# Patient Record
Sex: FEMALE | Race: OTHER | HISPANIC OR LATINO | URBAN - METROPOLITAN AREA
[De-identification: names, ages, dates, MRNs, and addresses within clinical notes are randomized per-mention and may not be internally consistent; named-entity substitution may affect disease eponyms.]

---

## 2021-04-12 ENCOUNTER — EMERGENCY (EMERGENCY)
Facility: HOSPITAL | Age: 31
LOS: 0 days | Discharge: HOME | End: 2021-04-12
Attending: EMERGENCY MEDICINE | Admitting: EMERGENCY MEDICINE
Payer: COMMERCIAL

## 2021-04-12 VITALS
TEMPERATURE: 98 F | RESPIRATION RATE: 18 BRPM | SYSTOLIC BLOOD PRESSURE: 115 MMHG | DIASTOLIC BLOOD PRESSURE: 65 MMHG | OXYGEN SATURATION: 100 % | HEART RATE: 96 BPM
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
## 2021-04-12 DIAGNOSIS — M54.2 CERVICALGIA: ICD-10-CM
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
## 2021-04-12 PROCEDURE — 99283 EMERGENCY DEPT VISIT LOW MDM: CPT

## 2021-04-12 RX ORDER — IBUPROFEN 200 MG
600 TABLET ORAL ONCE
Refills: 0 | Status: COMPLETED | OUTPATIENT
Start: 2021-04-12 | End: 2021-04-12

## 2021-04-12 RX ADMIN — Medication 600 MILLIGRAM(S): at 15:24

## 2021-04-12 NOTE — ED PROVIDER NOTE - PROGRESS NOTE DETAILS
Ice and analgesia given.  Patient to be discharged from ED. Any available test results were discussed with patient and/or family. Verbal instructions given, including instructions to return to ED immediately for any new, worsening, or concerning symptoms. Patient endorsed understanding. Written discharge instructions additionally given, including follow-up plan.  Patient was given opportunity to ask questions.
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
## 2021-04-12 NOTE — ED PROVIDER NOTE - PHYSICAL EXAMINATION
VITALS:  I have reviewed the initial vital signs.  GENERAL: Well-developed, well-nourished, in no acute distress. Nontoxic.  HEENT: NC/AT. EOMI, PERRLA. MMM.  NECK: supple w FROM. +R paraspinal muscle ttp. No midline cervical spinous tenderness, step offs, or deformity.  CARDIO: RRR, nl S1 and S2. No murmurs, rubs, or gallops.  PULM: Normal effort. CTA b/l without wheezes, rales, or rhonchi.  MSK: No paraspinal muscle ttp. No midline thoracic or lumbar spinous tenderness, step offs, or deformity. Normal, steady gait. FROM to extremities x4. No joint swelling, erythema, deformity, or ttp.  GI: Abdomen soft and non-distended. Nontender.  SKIN: Warm, dry. No erythema, ecchymosis, or wounds. No seatbelt sign.  NEURO: A&Ox3. Speech clear. CN II-XII intact. 5/5 strength to upper and lower extremities b/l. Sensation intact and equal throughout.  PSYCH: Calm and cooperative.
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
## 2021-04-12 NOTE — ED PROVIDER NOTE - PATIENT PORTAL LINK FT
You can access the FollowMyHealth Patient Portal offered by [facility] by registering at the following website: [email or web address]. By joining [facility]’s FollowMyHealth portal, you will also be able to view your health information using other applications (apps) compatible with our system.

## 2021-04-12 NOTE — ED PROVIDER NOTE - NS ED ROS FT
EYES: (-) vision changes, (-) blurry vision, (-) double vision, (-) eye pain  ENT: (-) tinnitus, (-) epistaxis, (-) tooth pain, (-) facial swelling  NECK: (+) neck pain, (-) neck stiffness  CARDIO: (-) chest pain, (-) palpitations  PULM: (-) cough, (-) shortness of breath,  (-) hemoptysis, (-) stridor  GI: (-) nausea, (-) vomiting, (-) abdominal pain, (-) bowel incontinence/retention  : (-) hematuria, (-) incontinence, (-) difficulty urinating, (-) urinary retention, (-) flank pain  HEME: (-) easy bruising, (-) easy bleeding  MSK: (-) back pain, (-) gait difficulty, (-) joint pain, (-) deformity, (-) joint swelling  SKIN: (-) rashes, (-) pallor,  (-) wounds, (-) ecchymosis  NEURO: (-) headache, (-) head injury, (-) LOC, (-) dizziness, (-) lightheadedness,  (-) weakness, (-) paresthesias, (-) numbness, (-) syncope    *all other systems negative except as documented above and in the HPI*

## 2021-06-29 ENCOUNTER — EMERGENCY (EMERGENCY)
Facility: HOSPITAL | Age: 31
LOS: 0 days | Discharge: HOME | End: 2021-06-30
Attending: EMERGENCY MEDICINE | Admitting: EMERGENCY MEDICINE
Payer: MEDICAID

## 2021-06-29 VITALS
RESPIRATION RATE: 17 BRPM | HEART RATE: 88 BPM | DIASTOLIC BLOOD PRESSURE: 64 MMHG | OXYGEN SATURATION: 100 % | TEMPERATURE: 98 F | WEIGHT: 113.98 LBS | SYSTOLIC BLOOD PRESSURE: 100 MMHG

## 2021-06-29 DIAGNOSIS — R11.2 NAUSEA WITH VOMITING, UNSPECIFIED: ICD-10-CM

## 2021-06-29 DIAGNOSIS — R10.10 UPPER ABDOMINAL PAIN, UNSPECIFIED: ICD-10-CM

## 2021-06-29 DIAGNOSIS — R19.7 DIARRHEA, UNSPECIFIED: ICD-10-CM

## 2021-06-29 DIAGNOSIS — N83.9 NONINFLAMMATORY DISORDER OF OVARY, FALLOPIAN TUBE AND BROAD LIGAMENT, UNSPECIFIED: ICD-10-CM

## 2021-06-29 DIAGNOSIS — Z90.721 ACQUIRED ABSENCE OF OVARIES, UNILATERAL: ICD-10-CM

## 2021-06-29 PROCEDURE — 99285 EMERGENCY DEPT VISIT HI MDM: CPT

## 2021-06-29 PROCEDURE — 99213 OFFICE O/P EST LOW 20 MIN: CPT

## 2021-06-29 NOTE — ED ADULT NURSE NOTE - NSIMPLEMENTINTERV_GEN_ALL_ED
Implemented All Universal Safety Interventions:  [place] to call system. Call bell, personal items and telephone within reach. Instruct patient to call for assistance. Room bathroom lighting operational. Non-slip footwear when patient is off stretcher. Physically safe environment: no spills, clutter or unnecessary equipment. Stretcher in lowest position, wheels locked, appropriate side rails in place.

## 2021-06-30 VITALS
OXYGEN SATURATION: 100 % | TEMPERATURE: 97 F | DIASTOLIC BLOOD PRESSURE: 63 MMHG | SYSTOLIC BLOOD PRESSURE: 94 MMHG | RESPIRATION RATE: 18 BRPM | HEART RATE: 71 BPM

## 2021-06-30 LAB
ALBUMIN SERPL ELPH-MCNC: 4.7 G/DL — SIGNIFICANT CHANGE UP (ref 3.5–5.2)
ALP SERPL-CCNC: 75 U/L — SIGNIFICANT CHANGE UP (ref 30–115)
ALT FLD-CCNC: 25 U/L — SIGNIFICANT CHANGE UP (ref 0–41)
ANION GAP SERPL CALC-SCNC: 15 MMOL/L — HIGH (ref 7–14)
APPEARANCE UR: CLEAR — SIGNIFICANT CHANGE UP
AST SERPL-CCNC: 28 U/L — SIGNIFICANT CHANGE UP (ref 0–41)
BACTERIA # UR AUTO: NEGATIVE — SIGNIFICANT CHANGE UP
BASOPHILS # BLD AUTO: 0.04 K/UL — SIGNIFICANT CHANGE UP (ref 0–0.2)
BASOPHILS NFR BLD AUTO: 0.2 % — SIGNIFICANT CHANGE UP (ref 0–1)
BILIRUB DIRECT SERPL-MCNC: <0.2 MG/DL — SIGNIFICANT CHANGE UP (ref 0–0.2)
BILIRUB INDIRECT FLD-MCNC: >0.3 MG/DL — SIGNIFICANT CHANGE UP (ref 0.2–1.2)
BILIRUB SERPL-MCNC: 0.5 MG/DL — SIGNIFICANT CHANGE UP (ref 0.2–1.2)
BILIRUB UR-MCNC: NEGATIVE — SIGNIFICANT CHANGE UP
BLD GP AB SCN SERPL QL: SIGNIFICANT CHANGE UP
BUN SERPL-MCNC: 10 MG/DL — SIGNIFICANT CHANGE UP (ref 10–20)
CALCIUM SERPL-MCNC: 9.4 MG/DL — SIGNIFICANT CHANGE UP (ref 8.5–10.1)
CHLORIDE SERPL-SCNC: 104 MMOL/L — SIGNIFICANT CHANGE UP (ref 98–110)
CO2 SERPL-SCNC: 22 MMOL/L — SIGNIFICANT CHANGE UP (ref 17–32)
COLOR SPEC: YELLOW — SIGNIFICANT CHANGE UP
CREAT SERPL-MCNC: 0.5 MG/DL — LOW (ref 0.7–1.5)
DIFF PNL FLD: ABNORMAL
EOSINOPHIL # BLD AUTO: 0.17 K/UL — SIGNIFICANT CHANGE UP (ref 0–0.7)
EOSINOPHIL NFR BLD AUTO: 1 % — SIGNIFICANT CHANGE UP (ref 0–8)
EPI CELLS # UR: 5 /HPF — SIGNIFICANT CHANGE UP (ref 0–5)
GLUCOSE SERPL-MCNC: 97 MG/DL — SIGNIFICANT CHANGE UP (ref 70–99)
GLUCOSE UR QL: NEGATIVE — SIGNIFICANT CHANGE UP
HCG SERPL-ACNC: <0.6 MIU/ML — SIGNIFICANT CHANGE UP
HCT VFR BLD CALC: 42.3 % — SIGNIFICANT CHANGE UP (ref 37–47)
HGB BLD-MCNC: 14 G/DL — SIGNIFICANT CHANGE UP (ref 12–16)
HYALINE CASTS # UR AUTO: 4 /LPF — SIGNIFICANT CHANGE UP (ref 0–7)
IMM GRANULOCYTES NFR BLD AUTO: 0.4 % — HIGH (ref 0.1–0.3)
KETONES UR-MCNC: ABNORMAL
LACTATE SERPL-SCNC: 1.3 MMOL/L — SIGNIFICANT CHANGE UP (ref 0.7–2)
LDH SERPL L TO P-CCNC: 201 — SIGNIFICANT CHANGE UP (ref 50–242)
LEUKOCYTE ESTERASE UR-ACNC: NEGATIVE — SIGNIFICANT CHANGE UP
LIDOCAIN IGE QN: 27 U/L — SIGNIFICANT CHANGE UP (ref 7–60)
LYMPHOCYTES # BLD AUTO: 1.75 K/UL — SIGNIFICANT CHANGE UP (ref 1.2–3.4)
LYMPHOCYTES # BLD AUTO: 10.1 % — LOW (ref 20.5–51.1)
MCHC RBC-ENTMCNC: 30.8 PG — SIGNIFICANT CHANGE UP (ref 27–31)
MCHC RBC-ENTMCNC: 33.1 G/DL — SIGNIFICANT CHANGE UP (ref 32–37)
MCV RBC AUTO: 93.2 FL — SIGNIFICANT CHANGE UP (ref 81–99)
MONOCYTES # BLD AUTO: 1.25 K/UL — HIGH (ref 0.1–0.6)
MONOCYTES NFR BLD AUTO: 7.2 % — SIGNIFICANT CHANGE UP (ref 1.7–9.3)
NEUTROPHILS # BLD AUTO: 14.06 K/UL — HIGH (ref 1.4–6.5)
NEUTROPHILS NFR BLD AUTO: 81.1 % — HIGH (ref 42.2–75.2)
NITRITE UR-MCNC: NEGATIVE — SIGNIFICANT CHANGE UP
NRBC # BLD: 0 /100 WBCS — SIGNIFICANT CHANGE UP (ref 0–0)
PH UR: 5.5 — SIGNIFICANT CHANGE UP (ref 5–8)
PLATELET # BLD AUTO: 206 K/UL — SIGNIFICANT CHANGE UP (ref 130–400)
POTASSIUM SERPL-MCNC: 3.6 MMOL/L — SIGNIFICANT CHANGE UP (ref 3.5–5)
POTASSIUM SERPL-SCNC: 3.6 MMOL/L — SIGNIFICANT CHANGE UP (ref 3.5–5)
PROT SERPL-MCNC: 7.4 G/DL — SIGNIFICANT CHANGE UP (ref 6–8)
PROT UR-MCNC: ABNORMAL
RBC # BLD: 4.54 M/UL — SIGNIFICANT CHANGE UP (ref 4.2–5.4)
RBC # FLD: 13.3 % — SIGNIFICANT CHANGE UP (ref 11.5–14.5)
RBC CASTS # UR COMP ASSIST: 3 /HPF — SIGNIFICANT CHANGE UP (ref 0–4)
SODIUM SERPL-SCNC: 141 MMOL/L — SIGNIFICANT CHANGE UP (ref 135–146)
SP GR SPEC: 1.03 — HIGH (ref 1.01–1.03)
UROBILINOGEN FLD QL: SIGNIFICANT CHANGE UP
WBC # BLD: 17.34 K/UL — HIGH (ref 4.8–10.8)
WBC # FLD AUTO: 17.34 K/UL — HIGH (ref 4.8–10.8)
WBC UR QL: 2 /HPF — SIGNIFICANT CHANGE UP (ref 0–5)

## 2021-06-30 PROCEDURE — 76830 TRANSVAGINAL US NON-OB: CPT | Mod: 26

## 2021-06-30 PROCEDURE — 74177 CT ABD & PELVIS W/CONTRAST: CPT | Mod: 26,MA

## 2021-06-30 RX ORDER — IOHEXOL 300 MG/ML
30 INJECTION, SOLUTION INTRAVENOUS ONCE
Refills: 0 | Status: COMPLETED | OUTPATIENT
Start: 2021-06-30 | End: 2021-06-30

## 2021-06-30 RX ORDER — FAMOTIDINE 10 MG/ML
20 INJECTION INTRAVENOUS ONCE
Refills: 0 | Status: COMPLETED | OUTPATIENT
Start: 2021-06-30 | End: 2021-06-30

## 2021-06-30 RX ORDER — ONDANSETRON 8 MG/1
4 TABLET, FILM COATED ORAL ONCE
Refills: 0 | Status: COMPLETED | OUTPATIENT
Start: 2021-06-29 | End: 2021-06-30

## 2021-06-30 RX ORDER — DIPHENHYDRAMINE HYDROCHLORIDE AND LIDOCAINE HYDROCHLORIDE AND ALUMINUM HYDROXIDE AND MAGNESIUM HYDRO
30 KIT ONCE
Refills: 0 | Status: COMPLETED | OUTPATIENT
Start: 2021-06-30 | End: 2021-06-30

## 2021-06-30 RX ADMIN — IOHEXOL 30 MILLILITER(S): 300 INJECTION, SOLUTION INTRAVENOUS at 00:30

## 2021-06-30 RX ADMIN — FAMOTIDINE 20 MILLIGRAM(S): 10 INJECTION INTRAVENOUS at 00:42

## 2021-06-30 RX ADMIN — ONDANSETRON 4 MILLIGRAM(S): 8 TABLET, FILM COATED ORAL at 00:42

## 2021-06-30 NOTE — ED PROVIDER NOTE - PROGRESS NOTE DETAILS
Abundio: case signed out to me by Dr. Curtis -- patient presented with 4 days of intermittent, cramping abdominal pain associated with multiple episodes of non bloody, non bilious vomiting and non bloody diarrhea -- on imagining found to have large R ovarian septated structure (of note is sp R salpingectomy). Though sx suggestive of gastro and not torsion. Given size of lesion, is pending US. Patient resting comfortably S/O from [facility]RON[facility] Estrella to follow us. Patient states feeling better. Abundio: received call from Dr. 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 However, based on size of mass, also cannot exclude torsion. GYN consulted. Abundio: received call from Dr. 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 However, based on size of mass, also cannot exclude torsion. GYN consulted.    Case signed out to Dr. Celeste Dr. Celeste: Received sign out from Dr. Del Castillo  Pt pending GYN consult Talked with GYN. Pt can fu as outpatient next week. They will call her for her appointment time. Recommended cancer screening labs. Discussed signs of torsion with patient

## 2021-06-30 NOTE — ED PROVIDER NOTE - DISPOSITION TYPE
DISCHARGE Subsequent Stages Histo Method Verbiage: Using a similar technique to that described above, a thin layer of tissue was removed from all areas where tumor was visible on the previous stage.  The tissue was again oriented, mapped, dyed, and processed as above.

## 2021-06-30 NOTE — CONSULT NOTE ADULT - SUBJECTIVE AND OBJECTIVE BOX
MINISTERIO PGY2    Chief Complaint: abdominal pain     HPI:   31yo P1, LMP 21, with no significant PMH, presented to ED for epigastric pain with nausea, vomiting, and diarrhea. Patient reported loose stool for 1 week and last night started having nausea and bloating and after eating a es she started having vomiting and diarrhea (5 episodes of both non-bloody diarrhea and NBNB vomiting). Denies fever or chills. Epigastric pain was 10 out of 10 in intensity last night and spontaneously improved to 3 out of 10 in ED. Received zofran in ED which helped with nausea. GYN was consulted for CT and TVUS finding of complex adnexal mass measuring 5 x 4 x 4 cm with septations and nodularity.     Location -  Severity -  Quality -  Duration -  Timing -   Modifying Factors -   Associated Signs/Symptoms -     Ob/Gyn History:  G P                 LMP -                   Cycle Length -   Denies history of ovarian cysts, uterine fibroids, abnormal paps, or STIs  Last Pap Smear -   Last Mammogram -   Last Colonoscopy -        Denies the following: constitutional symptoms, visual symptoms, cardiovascular symptoms, respiratory symptoms, GI symptoms, musculoskeletal symptoms, skin symptoms, neurologic symptoms, hematologic symptoms, allergic symptoms, psychiatric symptoms  Except any pertinent positives listed.     Home Medications:      Allergies    No Known Allergies    Intolerances        PAST MEDICAL & SURGICAL HISTORY:      FAMILY HISTORY:      SOCIAL HISTORY: Denies cigarette use, alcohol use, or illicit drug use    Vital Signs Last 24 Hrs  T(F): 97.7 (2021 22:59), Max: 97.7 (2021 22:59)  HR: 88 (2021 22:59) (88 - 88)  BP: 100/64 (2021 22:59) (100/64 - 100/64)  RR: 17 (2021 22:59) (17 - 17)    General Appearance - AAOx3, NAD  Heart - S1S2 regular rate and rhythm  Lung - CTA Bilaterally  Abdomen - Soft, nontender, nondistended, no rebound, no rigidity, no guarding, bowel sounds present    GYN/Pelvis:  External genitalia:  Vagina:  Cervix:  Uterus:  Adnexa:    LABS:                        14.0   17.34 )-----------( 206      ( 2021 00:00 )             42.3         06-30    141  |  104  |  10  ----------------------------<  97  3.6   |  22  |  0.5<L>    Ca    9.4      2021 00:00    TPro  7.4  /  Alb  4.7  /  TBili  0.5  /  DBili  <0.2  /  AST  28  /  ALT  25  /  AlkPhos  75  06-30      Urinalysis Basic - ( 2021 00:20 )    Color: Yellow / Appearance: Clear / S.034 / pH: x  Gluc: x / Ketone: Moderate  / Bili: Negative / Urobili: <2 mg/dL   Blood: x / Protein: 30 mg/dL / Nitrite: Negative   Leuk Esterase: Negative / RBC: 3 /HPF / WBC 2 /HPF   Sq Epi: x / Non Sq Epi: 5 /HPF / Bacteria: Negative          RADIOLOGY & ADDITIONAL STUDIES:   OBN PGY2    Chief Complaint: abdominal pain     HPI:   31yo P1, LMP 21, with no significant PMH, presented to ED for epigastric pain with nausea, vomiting, and diarrhea. Patient reported loose stool for 1 week and last night started having nausea and bloating after eating a chicken empanada at a new restaurant and after eating a es she started having vomiting and diarrhea (5 episodes of both non-bloody diarrhea and NBNB vomiting). Denies fever or chills. Denies similar symptoms with anyone in her household and denies sick contacts. Epigastric pain was sharp and 10 out of 10 in intensity last night and spontaneously improved to 3 out of 10 in ED. Received zofran in ED which helped with nausea. GYN was consulted for CT and TVUS finding of complex adnexal mass measuring 5 x 4 x 4 cm with septations and nodularity. Patient had a history of right salpingectomy and ovarian cystectomy in 10/2020 done in [place] for likely fallopian tube torsion per patient. Per patient, the cyst was benign. Patient does not remember name of GYN. Patient denies current RLQ pain and endorsed RLQ pain only on palpation from first exam in ED. Denies unintentional weight loss, night sweats. Endorsed bloating and early satiety only when she eats fast food.     Ob/Gyn History:                   LMP -     21              Cycle Length - regular, monthly   Previous h/o ovarian cyst with right cystectomy and right salpingectomy in 2020.   H/o chlamydia in Aug 2020, treated with abx, negative VAL.   Denies history of uterine fibroids, abnormal paps.  Last Pap Smear - 2021, normal per patient    Denies the following: constitutional symptoms, visual symptoms, cardiovascular symptoms, respiratory symptoms, GI symptoms, musculoskeletal symptoms, skin symptoms, neurologic symptoms, hematologic symptoms, allergic symptoms, psychiatric symptoms  Except any pertinent positives listed.     Home Medications: none  Allergies:  No Known Allergies      PAST MEDICAL & SURGICAL HISTORY:  No significant PMH  H/o R ovarian cystectomy and R. salpingectomy     FAMILY HISTORY:  colon cancer (great grandma)   GERD (mother)     SOCIAL HISTORY: Denies cigarette use, alcohol use, or illicit drug use    Vital Signs Last 24 Hrs  T(F): 97.7 (2021 22:59), Max: 97.7 (2021 22:59)  HR: 88 (2021 22:59) (88 - 88)  BP: 100/64 (2021 22:59) (100/64 - 100/64)  RR: 17 (2021 22:59) (17 - 17)    General Appearance - AAOx3, NAD  Heart - S1S2 regular rate and rhythm  Lung - CTA Bilaterally  Abdomen - Soft, nontender, nondistended, no rebound, no rigidity, no guarding, bowel sounds present    GYN/Pelvis:  External genitalia: normal, no lesions   Vagina: no bleeding noted  Cervix: closed, negative CMT  Uterus: normal sized anteverted uterus, no fundal tenderness  Adnexa: no masses or tenderness palpated bilaterally     LABS:                        14.0   17.34 )-----------( 206      ( 2021 00:00 )             42.3             141  |  104  |  10  ----------------------------<  97  3.6   |  22  |  0.5<L>    Ca    9.4      2021 00:00    TPro  7.4  /  Alb  4.7  /  TBili  0.5  /  DBili  <0.2  /  AST  28  /  ALT  25  /  AlkPhos  75        Urinalysis Basic - ( 2021 00:20 )    Color: Yellow / Appearance: Clear / S.034 / pH: x  Gluc: x / Ketone: Moderate  / Bili: Negative / Urobili: <2 mg/dL   Blood: x / Protein: 30 mg/dL / Nitrite: Negative   Leuk Esterase: Negative / RBC: 3 /HPF / WBC 2 /HPF   Sq Epi: x / Non Sq Epi: 5 /HPF / Bacteria: Negative          RADIOLOGY & ADDITIONAL STUDIES:  < from: US Transvaginal (21 @ 07:44) >    EXAM:  US TRANSVAGINAL            PROCEDURE DATE:  2021            INTERPRETATION:  CLINICAL INFORMATION: Right adnexal cyst    LMP: 2021    COMPARISON: Abdominal pelvic CT 2021    TECHNIQUE:  Transabdominal and transvaginal ultrasound the pelvis was performed    FINDINGS:    Uterus: 8.0 cm x 4.4 cm x 5.9 cm. Within normal limits.  Endometrium: 7 mm. Within normal limits.    Right ovary: 5.8 cm x 3.4 cm x 4.5 cm. Right ovarian septated cyst measuring 5.5 x 4.4 x 3.0 cm. The septations are thick and there is some mural nodularity; both of these appear to contain vascular flow.  Left ovary: 2.3 cm x 1.7 cm x 1.9 cm. Within normal limits.    Fluid: None.    IMPRESSION:    Right ovarian septated cyst measuring up to 5.5 cm withthick septations and some mural nodularity, both of which appear to contain vascular flow.    Findings are indeterminate and the possibility of a cystic neoplasm cannot be excluded. Follow-up pelvic ultrasound is recommended in 6 weeks and follow-up with laboratory tumor markers is recommended.    Although vascular flow is identified within the right ovary, the large right ovarian septated cyst can lead as a lead point for torsion and therefore torsion is not excluded on this examination.    Dr. Clay was made aware of the above findings on 2021 at 8:06 AM with read back              MAUREEN THOMPSON MD; Attending Radiologist  This document has been electronically signed. 2021  8:06AM    < end of copied text >  < from: CT Abdomen and Pelvis w/ Oral Cont and w/ IV Cont (21 @ 04:00) >    EXAM:  CT ABDOMEN AND PELVIS OC IC            PROCEDURE DATE:  2021            INTERPRETATION:  CLINICAL STATEMENT: Periumbilical pain.    TECHNIQUE: Contiguous axial CT images were obtained from the lower chest to the pubic symphysis following administration of intravenous contrast.  Oral contrast was administered.  Reformatted images in the coronal and sagittal planes were acquired.    COMPARISON CT: None.      FINDINGS:    LOWER CHEST: Unremarkable.    HEPATOBILIARY: Left hepatic subcentimeter hypodensity too small to characterize. Contracted gallbladder.    SPLEEN: Unremarkable.    PANCREAS: Unremarkable.    ADRENAL GLANDS: Unremarkable.    KIDNEYS: Symmetric renal enhancement. No hydronephrosis. Right renal subcentimeter hypodensity too small to characterize.    ABDOMINOPELVIC NODES: Unremarkable.    PELVIC ORGANS: Right adnexal septated cyst measuring 5.4 x 4.2 x 4.1 cm. Underdistended urinary bladder.    PERITONEUM/MESENTERY/BOWEL: Normal appendix. No bowel obstruction, ascites or pneumoperitoneum.    BONES/SOFT TISSUES: No acute osseous abnormality.      IMPRESSION:    No evidence of acute intra-abdominal pathology.    Right adnexal septated cyst measuring 5.4 x 4.2 x 4.1 cm. Pelvic ultrasound can be obtained for further evaluation if clinically indicated.    Additional Findings/Recommendations After Attending Radiologist Review:  Ovarian torsion is not excluded, recommend correlation with clinical exam and scheduled pelvic ultrasound examination.    Incidental note of radiodensity within the urinary bladder, correlation for recent prior intravenous contrast administration.    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            BRIAN NAVARRO MD; Resident Radiologist  This document has been electronically signed.  HARI ANAYA MD; Attending Radiologist  This document has been electronically signed. 2021  4:42AM    < end of copied text >       OBN PGY2    Chief Complaint: abdominal pain     HPI:   31yo P1, LMP 21, with no significant PMH, presented to ED for epigastric pain with nausea, vomiting, and diarrhea. Patient reported loose stool for 1 week and last night started having nausea and bloating after eating a chicken empanada at a new restaurant and after eating a es she started having vomiting and diarrhea (5 episodes of both non-bloody diarrhea and NBNB vomiting). Denies fever or chills. Denies similar symptoms with anyone in her household and denies sick contacts. Epigastric pain was sharp and 10 out of 10 in intensity last night and spontaneously improved to 3 out of 10 in ED. Received zofran in ED which helped with nausea. GYN was consulted for CT and TVUS finding of complex adnexal mass measuring 5 x 4 x 4 cm with septations and nodularity. Patient had a history of right salpingectomy and ovarian cystectomy in 10/2020 done in [place] for likely fallopian tube torsion per patient. Per patient, the cyst was benign. Patient does not remember name of GYN. Patient denies current RLQ pain and endorsed RLQ pain only on palpation from first exam in ED. Denies unintentional weight loss, night sweats. Endorsed bloating and early satiety only when she eats fast food.     Ob/Gyn History:                   LMP -     21              Cycle Length - regular, monthly   FT  x1, no complications  Previous h/o ovarian cyst with right cystectomy and right salpingectomy in 2020.   H/o chlamydia in Aug 2020, treated with abx, negative VAL.   Denies history of uterine fibroids, abnormal paps.  Last Pap Smear - 2021, normal per patient    Denies the following: constitutional symptoms, visual symptoms, cardiovascular symptoms, respiratory symptoms, GI symptoms, musculoskeletal symptoms, skin symptoms, neurologic symptoms, hematologic symptoms, allergic symptoms, psychiatric symptoms  Except any pertinent positives listed.     Home Medications: none  Allergies:  No Known Allergies      PAST MEDICAL & SURGICAL HISTORY:  No significant PMH  H/o R ovarian cystectomy and R. salpingectomy     FAMILY HISTORY:  colon cancer (great grandma)   GERD (mother)     SOCIAL HISTORY: Denies cigarette use, alcohol use, or illicit drug use    Vital Signs Last 24 Hrs  T(F): 97.7 (2021 22:59), Max: 97.7 (2021 22:59)  HR: 88 (2021 22:59) (88 - 88)  BP: 100/64 (2021 22:59) (100/64 - 100/64)  RR: 17 (2021 22:59) (17 - 17)    General Appearance - AAOx3, NAD  Heart - S1S2 regular rate and rhythm  Lung - CTA Bilaterally  Abdomen - Soft, nontender, nondistended, no rebound, no rigidity, no guarding, bowel sounds present    GYN/Pelvis:  External genitalia: normal, no lesions   Vagina: no bleeding noted  Cervix: closed, negative CMT  Uterus: normal sized anteverted uterus, no fundal tenderness  Adnexa: no masses or tenderness palpated bilaterally     LABS:                        14.0   17.34 )-----------( 206      ( 2021 00:00 )             42.3             141  |  104  |  10  ----------------------------<  97  3.6   |  22  |  0.5<L>    Ca    9.4      2021 00:00    TPro  7.4  /  Alb  4.7  /  TBili  0.5  /  DBili  <0.2  /  AST  28  /  ALT  25  /  AlkPhos  75        Urinalysis Basic - ( 2021 00:20 )    Color: Yellow / Appearance: Clear / S.034 / pH: x  Gluc: x / Ketone: Moderate  / Bili: Negative / Urobili: <2 mg/dL   Blood: x / Protein: 30 mg/dL / Nitrite: Negative   Leuk Esterase: Negative / RBC: 3 /HPF / WBC 2 /HPF   Sq Epi: x / Non Sq Epi: 5 /HPF / Bacteria: Negative          RADIOLOGY & ADDITIONAL STUDIES:  < from: US Transvaginal (21 @ 07:44) >    EXAM:  US TRANSVAGINAL            PROCEDURE DATE:  2021            INTERPRETATION:  CLINICAL INFORMATION: Right adnexal cyst    LMP: 2021    COMPARISON: Abdominal pelvic CT 2021    TECHNIQUE:  Transabdominal and transvaginal ultrasound the pelvis was performed    FINDINGS:    Uterus: 8.0 cm x 4.4 cm x 5.9 cm. Within normal limits.  Endometrium: 7 mm. Within normal limits.    Right ovary: 5.8 cm x 3.4 cm x 4.5 cm. Right ovarian septated cyst measuring 5.5 x 4.4 x 3.0 cm. The septations are thick and there is some mural nodularity; both of these appear to contain vascular flow.  Left ovary: 2.3 cm x 1.7 cm x 1.9 cm. Within normal limits.    Fluid: None.    IMPRESSION:    Right ovarian septated cyst measuring up to 5.5 cm withthick septations and some mural nodularity, both of which appear to contain vascular flow.    Findings are indeterminate and the possibility of a cystic neoplasm cannot be excluded. Follow-up pelvic ultrasound is recommended in 6 weeks and follow-up with laboratory tumor markers is recommended.    Although vascular flow is identified within the right ovary, the large right ovarian septated cyst can lead as a lead point for torsion and therefore torsion is not excluded on this examination.    Dr. Clay was made aware of the above findings on 2021 at 8:06 AM with read back              MAUREEN THOMPSON MD; Attending Radiologist  This document has been electronically signed. 2021  8:06AM    < end of copied text >  < from: CT Abdomen and Pelvis w/ Oral Cont and w/ IV Cont (21 @ 04:00) >    EXAM:  CT ABDOMEN AND PELVIS OC IC            PROCEDURE DATE:  2021            INTERPRETATION:  CLINICAL STATEMENT: Periumbilical pain.    TECHNIQUE: Contiguous axial CT images were obtained from the lower chest to the pubic symphysis following administration of intravenous contrast.  Oral contrast was administered.  Reformatted images in the coronal and sagittal planes were acquired.    COMPARISON CT: None.      FINDINGS:    LOWER CHEST: Unremarkable.    HEPATOBILIARY: Left hepatic subcentimeter hypodensity too small to characterize. Contracted gallbladder.    SPLEEN: Unremarkable.    PANCREAS: Unremarkable.    ADRENAL GLANDS: Unremarkable.    KIDNEYS: Symmetric renal enhancement. No hydronephrosis. Right renal subcentimeter hypodensity too small to characterize.    ABDOMINOPELVIC NODES: Unremarkable.    PELVIC ORGANS: Right adnexal septated cyst measuring 5.4 x 4.2 x 4.1 cm. Underdistended urinary bladder.    PERITONEUM/MESENTERY/BOWEL: Normal appendix. No bowel obstruction, ascites or pneumoperitoneum.    BONES/SOFT TISSUES: No acute osseous abnormality.      IMPRESSION:    No evidence of acute intra-abdominal pathology.    Right adnexal septated cyst measuring 5.4 x 4.2 x 4.1 cm. Pelvic ultrasound can be obtained for further evaluation if clinically indicated.    Additional Findings/Recommendations After Attending Radiologist Review:  Ovarian torsion is not excluded, recommend correlation with clinical exam and scheduled pelvic ultrasound examination.    Incidental note of radiodensity within the urinary bladder, correlation for recent prior intravenous contrast administration.    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            BRIAN NAVARRO MD; Resident Radiologist  This document has been electronically signed.  HARI ANAYA MD; Attending Radiologist  This document has been electronically signed. 2021  4:42AM    < end of copied text >

## 2021-06-30 NOTE — ED PROVIDER NOTE - ATTENDING CONTRIBUTION TO CARE
30F pmh R ovarian cyst s/o ?salpingectomy in past p/w abd pain x 4d. Intermitt migratory abd pain/cramping, inintially accomp by multiple episodes watery diarrhea, still present now with multiple episodes nbnb vomiting today. Accomp by epigastric pain. Denies f/c, uri sx, cough, flank pain, urinary sx, vag discharge, rash. No sick contacts, recent travel or abx. PCP [place].    PE:  nad  skin warm, dry  ncat  neck supple  rrr nl s1s2 no mrg  ctab no wrr  abd soft nd +epigastric ttp rest non-tender no palpable masses no rgr  back non-tender no cvat  ext no cce dpi  neuro aaox3 grossly nf exam

## 2021-06-30 NOTE — ED PROVIDER NOTE - NS ED ROS FT
CONST: No fever, chills or bodyaches  EYES: No pain, redness, drainage or visual changes.  ENT: No ear pain or discharge, nasal discharge or congestion. No sore throat  CARD: No chest pain, palpitations  RESP: No SOB, cough, hemoptysis. No hx of asthma or COPD  GI: (+) abdominal pain, N/V/D  : No urinary symptoms  MS: No joint pain, back pain or extremity pain/injury  SKIN: No rashes  NEURO: No headache, dizziness, paresthesias or LOC

## 2021-06-30 NOTE — ED PROVIDER NOTE - PATIENT PORTAL LINK FT
You can access the FollowMyHealth Patient Portal offered by [facility] by registering at the following website: [email or web address]. By joining [facility]’s FollowMyHealth portal, you will also be able to view your health information using other applications (apps) compatible with our system.

## 2021-06-30 NOTE — CONSULT NOTE ADULT - ATTENDING COMMENTS
Agree with resident note. 29 y/o P1 with RUQ pain and incidental finding of 5 cm ovarian cyst, low suspicion of torsion. Follow up tumor markers. Follow up outpatient.

## 2021-06-30 NOTE — CONSULT NOTE ADULT - ASSESSMENT
INCOMPLETE  29yo P1, h/o right salpingectomy and right ovarian cystectomy in 10/2020, with epigastric pain with vomiting and diarrhea likely from gastroenteritis, with incidental imaging findings of 5cm right ovarian cyst with septations and mural nodularity suspicious for benign vs borderline vs malignant neoplasm, low suspicion for ovarian torsion, clinically and hemodynamically stable.    - no acute GYN intervention  - Discussed with patient imaging findings and that definitive diagnosis only possible with surgery. Discussed immediate surgical management vs delayed surgical management vs expectant management of adnexal mass. Discussed low suspicion for ovarian torsion as RLQ pain only elicited upon deep palpation and pain is unlike previous episode of torsion. Counseled patient on risks/benefits/alternatives of either option. Patient highly prefers delayed surgical management after gastroenteritis resolves and tumor markers are resulted.   - Discussed torsion precautions and to come to ED immediately if pain worsens and associated with n/v.  - f/u tumor markers: CEA, CA-125, CA 19-9, AFP, BHCG, LDH, estradiol, inhibin A, inhibin B  - will f/u outpatient with Dr. Sommers next week at [facility] for f/u on tumor marker results and surgical planning   - hydration encouraged for GI symptoms  - Discussed with  and    - Dispo per ED 29yo P1, h/o right salpingectomy and right ovarian cystectomy in 10/2020, with epigastric pain with vomiting and diarrhea likely from gastroenteritis, with incidental imaging findings of 5cm right ovarian cyst with septations and mural nodularity. Differential diagnosis includes benign vs borderline vs malignant ovarian neoplasm, low suspicion for ovarian torsion, clinically and hemodynamically stable.    - no acute GYN intervention  - Discussed with patient imaging findings and that definitive diagnosis only possible with surgery. Discussed immediate surgical management vs delayed surgical management vs expectant management of adnexal mass. Discussed low suspicion for ovarian torsion as RLQ pain only elicited upon deep palpation and pain is unlike previous episode of torsion. Counseled patient on risks/benefits/alternatives of either option. After shared decision making, patient highly prefers delayed surgical management after gastroenteritis resolves and tumor markers are resulted.   - Discussed torsion precautions and to come to ED immediately if pain worsens and associated with n/v.  - f/u tumor markers: CEA, CA-125, CA 19-9, AFP, BHCG, LDH, estradiol, inhibin A, inhibin B  - will f/u outpatient with Dr. Sommers next week at [facility] for f/u on tumor marker results and surgical planning   - hydration encouraged for GI symptoms  - Discussed with Dr. Sommers and Dr. Hansen   - Dispo per ED

## 2021-06-30 NOTE — ED PROVIDER NOTE - OBJECTIVE STATEMENT
31 y/o female with a PMH of right fallopian tube removed presents to the ED for evaluation of nausea, nbnb vomiting about 5-6x, and nonbloody diarrhea >10x that began around 10pm this evening. pt reports she is having upper abdominal pain. pt lmp was the first week of june. pt denies fever, chills, cough, chest pain, sob, burning or pain with urination, blood in the urine, rashes, weakness, recent trauma, hx of endoscopy or colonoscopy, hx of evaluation by gi.

## 2021-06-30 NOTE — ED PROVIDER NOTE - NSFOLLOWUPCLINICS_GEN_ALL_ED_FT
[facility]  OB/GYN  [address] [place] [address]  Phone: [phone]  Fax:   Follow Up Time: 7-10 Days

## 2021-07-01 LAB
CANCER AG125 SERPL-ACNC: 12 U/ML — SIGNIFICANT CHANGE UP
CEA SERPL-MCNC: 1.4 NG/ML — SIGNIFICANT CHANGE UP (ref 0–3.8)
ESTRADIOL FREE SERPL-MCNC: 516 PG/ML — SIGNIFICANT CHANGE UP

## 2021-07-06 LAB
INHIBIN A SERPL-MCNC: 31.7 PG/ML — SIGNIFICANT CHANGE UP
INHIBIN B SERUM: <7 PG/ML — SIGNIFICANT CHANGE UP

## 2021-07-13 ENCOUNTER — LABORATORY RESULT (OUTPATIENT)
Age: 31
End: 2021-07-13

## 2021-07-13 ENCOUNTER — OUTPATIENT (OUTPATIENT)
Dept: OUTPATIENT SERVICES | Facility: HOSPITAL | Age: 31
LOS: 1 days | Discharge: HOME | End: 2021-07-13

## 2021-07-13 ENCOUNTER — APPOINTMENT (OUTPATIENT)
Dept: OBGYN | Facility: CLINIC | Age: 31
End: 2021-07-13
Payer: MEDICAID

## 2021-07-13 VITALS
SYSTOLIC BLOOD PRESSURE: 90 MMHG | HEIGHT: 61 IN | DIASTOLIC BLOOD PRESSURE: 50 MMHG | WEIGHT: 109 LBS | BODY MASS INDEX: 20.58 KG/M2

## 2021-07-13 DIAGNOSIS — N83.299 OTHER OVARIAN CYST, UNSPECIFIED SIDE: ICD-10-CM

## 2021-07-13 DIAGNOSIS — Z00.00 ENCOUNTER FOR GENERAL ADULT MEDICAL EXAMINATION W/OUT ABNORMAL FINDINGS: ICD-10-CM

## 2021-07-13 PROCEDURE — 99213 OFFICE O/P EST LOW 20 MIN: CPT

## 2021-07-13 NOTE — HISTORY OF PRESENT ILLNESS
[FreeTextEntry1] : 29 y/o P1 LMP 7/2/21, presents today for follow up visit from the ED. Patient is new to [facility]. She was seen on 6/30 in [facility] ED [place] for epigastric pain, associated with nausea and vomiting likely secondary to gastroenteritis, with an incidental finding of a 5cm right ovarian cyst, with septations and nodularity, tumor markers wnl. Patient has a h/o right fallopian tube torsion s/p laparoscopic right salpingectomy and cystectomy on 10/2020. Denies any discomfort from the cyst. Denies fever, chills, severe RLQ pain, intermenstrual bleeding, unintentional weight loss, decrease appetite, or family history of GYN cancers. \par \par Last pap: unknown [LMPDate] : 7/2/21 [MensesFreq] : 28 [MensesLength] : 7

## 2021-07-13 NOTE — PHYSICAL EXAM
[Appropriately responsive] : appropriately responsive [Alert] : alert [No Acute Distress] : no acute distress [No Lymphadenopathy] : no lymphadenopathy [Regular Rate Rhythm] : regular rate rhythm [No Murmurs] : no murmurs [Clear to Auscultation B/L] : clear to auscultation bilaterally [Soft] : soft [Non-tender] : non-tender [Non-distended] : non-distended [No HSM] : No HSM [No Lesions] : no lesions [No Mass] : no mass [Oriented x3] : oriented x3 [Examination Of The Breasts] : a normal appearance [No Masses] : no breast masses were palpable [Labia Majora] : normal [Labia Minora] : normal [Normal] : normal [Uterine Adnexae] : normal [FreeTextEntry6] : right adnexal fullness

## 2021-07-13 NOTE — END OF VISIT
[] : Resident [FreeTextEntry3] : Agree with resident note. Patient with incidental ovarian cyst. Desires expectant management at this time. Follow up imaging in 6 weeks. If still present, will schedule ovarian cystectomy at that time. Follow up 6 weeks for results. Torsion precautions given

## 2021-08-11 ENCOUNTER — OUTPATIENT (OUTPATIENT)
Dept: OUTPATIENT SERVICES | Facility: HOSPITAL | Age: 31
LOS: 1 days | Discharge: HOME | End: 2021-08-11
Payer: MEDICAID

## 2021-08-11 DIAGNOSIS — N83.201 UNSPECIFIED OVARIAN CYST, RIGHT SIDE: ICD-10-CM

## 2021-08-11 PROCEDURE — 76856 US EXAM PELVIC COMPLETE: CPT | Mod: 26

## 2021-08-24 ENCOUNTER — OUTPATIENT (OUTPATIENT)
Dept: OUTPATIENT SERVICES | Facility: HOSPITAL | Age: 31
LOS: 1 days | Discharge: HOME | End: 2021-08-24

## 2021-08-24 ENCOUNTER — APPOINTMENT (OUTPATIENT)
Dept: OBGYN | Facility: CLINIC | Age: 31
End: 2021-08-24
Payer: MEDICAID

## 2021-08-24 VITALS
WEIGHT: 104 LBS | SYSTOLIC BLOOD PRESSURE: 88 MMHG | DIASTOLIC BLOOD PRESSURE: 50 MMHG | HEIGHT: 61 IN | BODY MASS INDEX: 19.63 KG/M2

## 2021-08-24 LAB — CYTOLOGY CVX/VAG DOC THIN PREP: ABNORMAL

## 2021-08-24 PROCEDURE — 99213 OFFICE O/P EST LOW 20 MIN: CPT

## 2021-08-24 NOTE — END OF VISIT
[] : Resident [FreeTextEntry3] : Patient here for follow up. Results of ultrasound and pap reviewed. Schedule for colposcopy. RTC for colposcopy

## 2021-08-24 NOTE — DISCUSSION/SUMMARY
[FreeTextEntry1] : 31 y/o with h/o right fallopian tube torsion s/p laparoscopic right salpingectomy and cystectomy, with complete resolution of right ovarian cyst, now with ASCUS, HPV pos, for colposcopy\par - discussed pap smear findings with patient, counseled on needing a colposcopy to rule out any precancerous lesions, patient agrees with plan \par - RTC in 6 month

## 2021-08-24 NOTE — HISTORY OF PRESENT ILLNESS
[FreeTextEntry1] : 31 y/o with h/o right fallopian tube torsion s/p laparoscopic right salpingectomy and cystectomy on 10/2020, presents for ultrasound reports. Patient had an incidental finding of a 5cm right ovarian cyst with septations and nodularity on 6/30 while getting an evaluation done for gastroenteritis in the ED. Patient repeated u/s on 7/3 showing resolution of cyst.  Currently denies abdominal pain, abnormal vaginal bleeding or discharge.\par \par She had a pap smear done showing ASCUS with HPV positive (neg for 16/18). Patient states she had "HPV vaccine" but does not recall if she completed all 3 doses. \par \par \par 7/3: US: uterus 6.6x4.2x5.4cm, endometrium 3mm. Right ovary 3cm, wnl, left ovary: 4.1cm, wnl. Septated cyst seen within the right adnexa no longer identified. Normal study

## 2021-11-18 ENCOUNTER — APPOINTMENT (OUTPATIENT)
Dept: OBGYN | Facility: CLINIC | Age: 31
End: 2021-11-18

## 2022-02-08 ENCOUNTER — LABORATORY RESULT (OUTPATIENT)
Age: 32
End: 2022-02-08

## 2022-02-08 ENCOUNTER — APPOINTMENT (OUTPATIENT)
Dept: OBGYN | Facility: CLINIC | Age: 32
End: 2022-02-08
Payer: MEDICAID

## 2022-02-08 ENCOUNTER — ASOB RESULT (OUTPATIENT)
Age: 32
End: 2022-02-08

## 2022-02-08 VITALS
WEIGHT: 106 LBS | TEMPERATURE: 97.8 F | HEART RATE: 79 BPM | SYSTOLIC BLOOD PRESSURE: 100 MMHG | HEIGHT: 61 IN | DIASTOLIC BLOOD PRESSURE: 73 MMHG | BODY MASS INDEX: 20.01 KG/M2

## 2022-02-08 DIAGNOSIS — Z80.0 FAMILY HISTORY OF MALIGNANT NEOPLASM OF DIGESTIVE ORGANS: ICD-10-CM

## 2022-02-08 DIAGNOSIS — Z72.3 LACK OF PHYSICAL EXERCISE: ICD-10-CM

## 2022-02-08 DIAGNOSIS — N91.2 AMENORRHEA, UNSPECIFIED: ICD-10-CM

## 2022-02-08 DIAGNOSIS — R11.0 NAUSEA: ICD-10-CM

## 2022-02-08 DIAGNOSIS — N83.201 UNSPECIFIED OVARIAN CYST, RIGHT SIDE: ICD-10-CM

## 2022-02-08 DIAGNOSIS — Z78.9 OTHER SPECIFIED HEALTH STATUS: ICD-10-CM

## 2022-02-08 DIAGNOSIS — Z80.3 FAMILY HISTORY OF MALIGNANT NEOPLASM OF BREAST: ICD-10-CM

## 2022-02-08 LAB
HCG UR QL: POSITIVE
QUALITY CONTROL: YES

## 2022-02-08 PROCEDURE — 76801 OB US < 14 WKS SINGLE FETUS: CPT

## 2022-02-08 PROCEDURE — 81025 URINE PREGNANCY TEST: CPT

## 2022-02-08 PROCEDURE — 76817 TRANSVAGINAL US OBSTETRIC: CPT | Mod: 59

## 2022-02-08 PROCEDURE — 99214 OFFICE O/P EST MOD 30 MIN: CPT

## 2022-02-08 RX ORDER — PYRIDOXINE HCL (VITAMIN B6) 25 MG
25 TABLET ORAL
Qty: 1 | Refills: 1 | Status: ACTIVE | COMMUNITY
Start: 2022-02-08 | End: 1900-01-01

## 2022-02-08 NOTE — PROCEDURE
[Amenorrhea] : Amenorrhea [Transvaginal Ultrasound] : transvaginal ultrasound [FreeTextEntry4] : siup at 10 wks  fhr pos celsa 9/6

## 2022-02-08 NOTE — HISTORY OF PRESENT ILLNESS
[Patient reported PAP Smear was abnormal] : Patient reported PAP Smear was abnormal [Y] : Positive pregnancy history [Menarche Age: ____] : age at menarche was [unfilled] [Papeardate] : 7-2021 [TextBox_31] : ascus , hpv [PGxTotal] : 1 [[facility] CampusxFulerm] : 1 [[facility] Center[facility]] : 1 [FreeTextEntry1] : 12-

## 2022-02-08 NOTE — DISCUSSION/SUMMARY
[FreeTextEntry1] : 30 yo  lmp 12/5  at 9.2 wks celsa 22  nausea \par pap recordes\par sono records\par sono siup at 10 wks fhr pos celsa / \par folic acid \par b6 25 mg bid\par diclegis for nausea\par prenatal labs/ panorama\par NT in 3 wks \par rto in 1 mo

## 2022-02-09 LAB
ABO + RH PNL BLD: NORMAL
BASOPHILS # BLD AUTO: 0.04 K/UL
BASOPHILS NFR BLD AUTO: 0.4 %
BLD GP AB SCN SERPL QL: NORMAL
CMV IGG SERPL QL: 2.9 U/ML
CMV IGG SERPL-IMP: POSITIVE
CMV IGM SERPL QL: <8 AU/ML
CMV IGM SERPL QL: NEGATIVE
EOSINOPHIL # BLD AUTO: 0.17 K/UL
EOSINOPHIL NFR BLD AUTO: 1.6 %
HBV SURFACE AB SER QL: NONREACTIVE
HBV SURFACE AG SER QL: NONREACTIVE
HCT VFR BLD CALC: 38.1 %
HCV AB SER QL: NONREACTIVE
HCV S/CO RATIO: 0.14 S/CO
HGB A MFR BLD: 97.4 %
HGB A2 MFR BLD: 2.6 %
HGB BLD-MCNC: 12.6 G/DL
HGB FRACT BLD-IMP: NORMAL
HIV1+2 AB SPEC QL IA.RAPID: NONREACTIVE
IMM GRANULOCYTES NFR BLD AUTO: 0.3 %
LYMPHOCYTES # BLD AUTO: 2.78 K/UL
LYMPHOCYTES NFR BLD AUTO: 25.8 %
MAN DIFF?: NORMAL
MCHC RBC-ENTMCNC: 30.9 PG
MCHC RBC-ENTMCNC: 33.1 G/DL
MCV RBC AUTO: 93.4 FL
MEV IGG FLD QL IA: <5 AU/ML
MEV IGG+IGM SER-IMP: NEGATIVE
MONOCYTES # BLD AUTO: 0.89 K/UL
MONOCYTES NFR BLD AUTO: 8.3 %
NEUTROPHILS # BLD AUTO: 6.85 K/UL
NEUTROPHILS NFR BLD AUTO: 63.6 %
PLATELET # BLD AUTO: 222 K/UL
RBC # BLD: 4.08 M/UL
RBC # FLD: 13.3 %
RUBV IGG FLD-ACNC: 7.8 INDEX
RUBV IGG SER-IMP: POSITIVE
T GONDII AB SER-IMP: NEGATIVE
T GONDII AB SER-IMP: NEGATIVE
T GONDII IGG SER QL: <3 IU/ML
T GONDII IGM SER QL: <3 AU/ML
T PALLIDUM AB SER QL IA: NEGATIVE
TSH SERPL-ACNC: 0.13 UIU/ML
VZV AB TITR SER: POSITIVE
VZV IGG SER IF-ACNC: 595 INDEX
WBC # FLD AUTO: 10.76 K/UL

## 2022-02-10 LAB
BACTERIA UR CULT: NORMAL
LEAD BLD-MCNC: <1 UG/DL

## 2022-02-11 LAB — AR GENE MUT ANL BLD/T: NORMAL

## 2022-02-15 ENCOUNTER — TRANSCRIPTION ENCOUNTER (OUTPATIENT)
Age: 32
End: 2022-02-15

## 2022-02-15 LAB — FMR1 GENE MUT ANL BLD/T: NORMAL

## 2022-02-17 LAB — CFTR MUT TESTED BLD/T: NEGATIVE

## 2022-02-25 ENCOUNTER — APPOINTMENT (OUTPATIENT)
Dept: ANTEPARTUM | Facility: CLINIC | Age: 32
End: 2022-02-25
Payer: MEDICAID

## 2022-02-25 ENCOUNTER — ASOB RESULT (OUTPATIENT)
Age: 32
End: 2022-02-25

## 2022-02-25 ENCOUNTER — OUTPATIENT (OUTPATIENT)
Dept: OUTPATIENT SERVICES | Facility: HOSPITAL | Age: 32
LOS: 1 days | Discharge: HOME | End: 2022-02-25

## 2022-02-25 PROCEDURE — 99211 OFF/OP EST MAY X REQ PHY/QHP: CPT | Mod: 25

## 2022-02-25 PROCEDURE — 76813 OB US NUCHAL MEAS 1 GEST: CPT | Mod: 26

## 2022-02-28 DIAGNOSIS — Z36.89 ENCOUNTER FOR OTHER SPECIFIED ANTENATAL SCREENING: ICD-10-CM

## 2022-02-28 DIAGNOSIS — Z36.3 ENCOUNTER FOR ANTENATAL SCREENING FOR MALFORMATIONS: ICD-10-CM

## 2022-02-28 DIAGNOSIS — Z3A.11 11 WEEKS GESTATION OF PREGNANCY: ICD-10-CM

## 2022-02-28 DIAGNOSIS — O35.9XX0 MATERNAL CARE FOR (SUSPECTED) FETAL ABNORMALITY AND DAMAGE, UNSPECIFIED, NOT APPLICABLE OR UNSPECIFIED: ICD-10-CM

## 2022-02-28 DIAGNOSIS — Z36.82 ENCOUNTER FOR ANTENATAL SCREENING FOR NUCHAL TRANSLUCENCY: ICD-10-CM

## 2022-03-10 ENCOUNTER — APPOINTMENT (OUTPATIENT)
Dept: OBGYN | Facility: CLINIC | Age: 32
End: 2022-03-10
Payer: MEDICAID

## 2022-03-10 VITALS
WEIGHT: 106 LBS | HEART RATE: 87 BPM | HEIGHT: 61 IN | SYSTOLIC BLOOD PRESSURE: 95 MMHG | DIASTOLIC BLOOD PRESSURE: 59 MMHG | TEMPERATURE: 97.9 F | BODY MASS INDEX: 20.01 KG/M2

## 2022-03-10 LAB
BILIRUB UR QL STRIP: NORMAL
CLARITY UR: CLEAR
COLLECTION METHOD: NORMAL
GLUCOSE UR-MCNC: NORMAL
HCG UR QL: 0.2 EU/DL
HGB UR QL STRIP.AUTO: NORMAL
KETONES UR-MCNC: NORMAL
LEUKOCYTE ESTERASE UR QL STRIP: ABNORMAL
NITRITE UR QL STRIP: NORMAL
PH UR STRIP: 6
PROT UR STRIP-MCNC: NORMAL
SP GR UR STRIP: 1.03

## 2022-03-10 PROCEDURE — 81003 URINALYSIS AUTO W/O SCOPE: CPT | Mod: QW

## 2022-03-10 PROCEDURE — 99213 OFFICE O/P EST LOW 20 MIN: CPT

## 2022-03-29 ENCOUNTER — APPOINTMENT (OUTPATIENT)
Dept: OBGYN | Facility: CLINIC | Age: 32
End: 2022-03-29
Payer: MEDICAID

## 2022-03-29 VITALS
DIASTOLIC BLOOD PRESSURE: 66 MMHG | HEIGHT: 61 IN | WEIGHT: 109 LBS | SYSTOLIC BLOOD PRESSURE: 110 MMHG | BODY MASS INDEX: 20.58 KG/M2 | TEMPERATURE: 97.2 F | HEART RATE: 91 BPM

## 2022-03-29 LAB
BILIRUB UR QL STRIP: NORMAL
CLARITY UR: CLEAR
COLLECTION METHOD: NORMAL
GLUCOSE UR-MCNC: NORMAL
HCG UR QL: 0.2 EU/DL
HGB UR QL STRIP.AUTO: ABNORMAL
KETONES UR-MCNC: NORMAL
LEUKOCYTE ESTERASE UR QL STRIP: ABNORMAL
NITRITE UR QL STRIP: NORMAL
PH UR STRIP: 5.5
PROT UR STRIP-MCNC: NORMAL
SP GR UR STRIP: 1.03

## 2022-03-29 PROCEDURE — 0502F SUBSEQUENT PRENATAL CARE: CPT

## 2022-03-29 PROCEDURE — 81003 URINALYSIS AUTO W/O SCOPE: CPT | Mod: QW

## 2022-03-30 LAB — TSH SERPL-ACNC: 0.54 UIU/ML

## 2022-04-01 LAB — BACTERIA UR CULT: NORMAL

## 2022-04-02 LAB
A VAGINAE DNA VAG QL NAA+PROBE: NORMAL
BVAB2 DNA VAG QL NAA+PROBE: NORMAL
C KRUSEI DNA VAG QL NAA+PROBE: NEGATIVE
MEGA1 DNA VAG QL NAA+PROBE: NORMAL
T VAGINALIS RRNA SPEC QL NAA+PROBE: NEGATIVE

## 2022-04-03 LAB
2ND TRIMESTER DATA: NORMAL
AFP PNL SERPL: NORMAL
AFP SERPL-ACNC: NORMAL
CLINICAL BIOCHEMIST REVIEW: NORMAL
NOTES NTD: NORMAL

## 2022-04-05 LAB — CMV IGG AVIDITY SERPL IA-RTO: 0.96

## 2022-04-12 ENCOUNTER — RX RENEWAL (OUTPATIENT)
Age: 32
End: 2022-04-12

## 2022-04-12 RX ORDER — FOLIC ACID 1 MG/1
1 TABLET ORAL DAILY
Qty: 30 | Refills: 1 | Status: ACTIVE | COMMUNITY
Start: 2022-02-08 | End: 1900-01-01

## 2022-04-25 ENCOUNTER — APPOINTMENT (OUTPATIENT)
Dept: ANTEPARTUM | Facility: CLINIC | Age: 32
End: 2022-04-25
Payer: MEDICAID

## 2022-04-25 ENCOUNTER — ASOB RESULT (OUTPATIENT)
Age: 32
End: 2022-04-25

## 2022-04-25 ENCOUNTER — OUTPATIENT (OUTPATIENT)
Dept: OUTPATIENT SERVICES | Facility: HOSPITAL | Age: 32
LOS: 1 days | Discharge: HOME | End: 2022-04-25

## 2022-04-25 PROCEDURE — 76811 OB US DETAILED SNGL FETUS: CPT | Mod: 26

## 2022-04-25 PROCEDURE — 99212 OFFICE O/P EST SF 10 MIN: CPT | Mod: 25

## 2022-04-25 NOTE — DISCUSSION/SUMMARY
[FreeTextEntry1] : Ms Alla Stratton is a [id number] at who is referred at 20w1d today for anatomic survey \par 1.  AN INTRAUTERINE RAY GESTATION IS NOTED.  THE FETUS IS VIGOROUSLY ACTIVE.  There are no SSx of hydrops or other compromise.  Overall FHR is 146bpm. Normal. \par 2.  ESTIMATED FETAL WEIGHT IS APPROPRIATE FOR THE STATED GESTATIONAL AGE.\par 3.  ADEQUATE AMNIOTIC FLUID VOLUME.\par 4.  NO MAJOR FETAL MALFORMATIONS ARE NOTED ON THE VISUALIZED ANATOMY \par      TODAY WITHIN THE LIMITATIONS OF ULTRASOUND.  No cardiac structural anomalies are identified. \par 5.  IRREGURAL FETAL HEART RHYTHM NOTED.  On M-mode there appears to be a "skipped" ventricular contraction every 5-7 beats, preceded by a premature atrial contraction.  Generally fetal cardiac arrhythmias that are not associated with structural abnormalities are generally not associated with suboptimal outcomes.  While our exam today shows normal cardiac views, we will defer to pediatric cardiology for further evaluation.  \par 6.  TRANSVAGINAL ULTRASOUND WAS USED IN ADDITION TO A TRANSABDOMINAL TO BETTER VISUALIZE THE CERVICAL LENGTH.\par \par RECOMMEND:  Ms Stratton is referred for Fetal ECHO with pediatric cardiology consult. \par                              FOLLOW UP ULTRASOUND EVERY 4 WEEKS FOR FETAL GROWTH. \par \par MD Martha, FACOG\par Maternal Fetal Medicine

## 2022-04-26 DIAGNOSIS — Z3A.20 20 WEEKS GESTATION OF PREGNANCY: ICD-10-CM

## 2022-04-26 DIAGNOSIS — O36.8390 MATERNAL CARE FOR ABNORMALITIES OF THE FETAL HEART RATE OR RHYTHM, UNSPECIFIED TRIMESTER, NOT APPLICABLE OR UNSPECIFIED: ICD-10-CM

## 2022-04-26 DIAGNOSIS — O35.9XX0 MATERNAL CARE FOR (SUSPECTED) FETAL ABNORMALITY AND DAMAGE, UNSPECIFIED, NOT APPLICABLE OR UNSPECIFIED: ICD-10-CM

## 2022-04-28 ENCOUNTER — APPOINTMENT (OUTPATIENT)
Dept: OBGYN | Facility: CLINIC | Age: 32
End: 2022-04-28
Payer: MEDICAID

## 2022-04-28 VITALS
DIASTOLIC BLOOD PRESSURE: 84 MMHG | WEIGHT: 106 LBS | SYSTOLIC BLOOD PRESSURE: 109 MMHG | BODY MASS INDEX: 20.01 KG/M2 | HEIGHT: 61 IN | HEART RATE: 70 BPM | TEMPERATURE: 98 F

## 2022-04-28 LAB
BILIRUB UR QL STRIP: NORMAL
CLARITY UR: CLEAR
COLLECTION METHOD: NORMAL
GLUCOSE UR-MCNC: NORMAL
HCG UR QL: 1 EU/DL
HGB UR QL STRIP.AUTO: NORMAL
KETONES UR-MCNC: NORMAL
LEUKOCYTE ESTERASE UR QL STRIP: ABNORMAL
NITRITE UR QL STRIP: NORMAL
PH UR STRIP: 8
PROT UR STRIP-MCNC: NORMAL
SP GR UR STRIP: 1.02

## 2022-04-28 PROCEDURE — 81003 URINALYSIS AUTO W/O SCOPE: CPT | Mod: QW

## 2022-04-28 PROCEDURE — 0502F SUBSEQUENT PRENATAL CARE: CPT

## 2022-05-24 ENCOUNTER — ASOB RESULT (OUTPATIENT)
Age: 32
End: 2022-05-24

## 2022-05-24 ENCOUNTER — OUTPATIENT (OUTPATIENT)
Dept: OUTPATIENT SERVICES | Facility: HOSPITAL | Age: 32
LOS: 1 days | Discharge: HOME | End: 2022-05-24

## 2022-05-24 ENCOUNTER — APPOINTMENT (OUTPATIENT)
Dept: ANTEPARTUM | Facility: CLINIC | Age: 32
End: 2022-05-24
Payer: MEDICAID

## 2022-05-24 PROCEDURE — 76816 OB US FOLLOW-UP PER FETUS: CPT | Mod: 26

## 2022-05-26 DIAGNOSIS — Z3A.24 24 WEEKS GESTATION OF PREGNANCY: ICD-10-CM

## 2022-05-26 DIAGNOSIS — O36.60X0 MATERNAL CARE FOR EXCESSIVE FETAL GROWTH, UNSPECIFIED TRIMESTER, NOT APPLICABLE OR UNSPECIFIED: ICD-10-CM

## 2022-05-26 DIAGNOSIS — O35.9XX0 MATERNAL CARE FOR (SUSPECTED) FETAL ABNORMALITY AND DAMAGE, UNSPECIFIED, NOT APPLICABLE OR UNSPECIFIED: ICD-10-CM

## 2022-06-02 ENCOUNTER — APPOINTMENT (OUTPATIENT)
Dept: OBGYN | Facility: CLINIC | Age: 32
End: 2022-06-02

## 2022-06-02 ENCOUNTER — LABORATORY RESULT (OUTPATIENT)
Age: 32
End: 2022-06-02

## 2022-06-02 VITALS
TEMPERATURE: 97.9 F | SYSTOLIC BLOOD PRESSURE: 90 MMHG | DIASTOLIC BLOOD PRESSURE: 62 MMHG | HEART RATE: 78 BPM | BODY MASS INDEX: 22.28 KG/M2 | WEIGHT: 118 LBS | HEIGHT: 61 IN

## 2022-06-02 LAB
BILIRUB UR QL STRIP: NORMAL
CLARITY UR: CLEAR
COLLECTION METHOD: NORMAL
GLUCOSE UR-MCNC: NORMAL
HCG UR QL: 0.2 EU/DL
HGB UR QL STRIP.AUTO: NORMAL
KETONES UR-MCNC: NORMAL
LEUKOCYTE ESTERASE UR QL STRIP: ABNORMAL
NITRITE UR QL STRIP: NORMAL
PH UR STRIP: 5.5
PROT UR STRIP-MCNC: NORMAL
SP GR UR STRIP: 1.03

## 2022-06-02 PROCEDURE — 81003 URINALYSIS AUTO W/O SCOPE: CPT | Mod: QW

## 2022-06-02 PROCEDURE — 0502F SUBSEQUENT PRENATAL CARE: CPT

## 2022-06-03 LAB
GLUCOSE 1H P 50 G GLC PO SERPL-MCNC: 107 MG/DL
TSH SERPL-ACNC: 1.23 UIU/ML

## 2022-06-21 ENCOUNTER — APPOINTMENT (OUTPATIENT)
Dept: ANTEPARTUM | Facility: CLINIC | Age: 32
End: 2022-06-21
Payer: MEDICAID

## 2022-06-21 ENCOUNTER — ASOB RESULT (OUTPATIENT)
Age: 32
End: 2022-06-21

## 2022-06-21 ENCOUNTER — OUTPATIENT (OUTPATIENT)
Dept: OUTPATIENT SERVICES | Facility: HOSPITAL | Age: 32
LOS: 1 days | Discharge: HOME | End: 2022-06-21

## 2022-06-21 PROCEDURE — 76816 OB US FOLLOW-UP PER FETUS: CPT | Mod: 26

## 2022-07-06 ENCOUNTER — APPOINTMENT (OUTPATIENT)
Dept: OBGYN | Facility: CLINIC | Age: 32
End: 2022-07-06

## 2022-07-06 VITALS
WEIGHT: 124 LBS | TEMPERATURE: 97.8 F | SYSTOLIC BLOOD PRESSURE: 94 MMHG | HEART RATE: 96 BPM | HEIGHT: 61 IN | DIASTOLIC BLOOD PRESSURE: 61 MMHG | BODY MASS INDEX: 23.41 KG/M2

## 2022-07-06 LAB
BILIRUB UR QL STRIP: NORMAL
CLARITY UR: CLEAR
COLLECTION METHOD: NORMAL
GLUCOSE UR-MCNC: NORMAL
HCG UR QL: 1 EU/DL
HGB UR QL STRIP.AUTO: NORMAL
KETONES UR-MCNC: NORMAL
LEUKOCYTE ESTERASE UR QL STRIP: ABNORMAL
NITRITE UR QL STRIP: NORMAL
PH UR STRIP: 6
PROT UR STRIP-MCNC: NORMAL
SP GR UR STRIP: 1.03

## 2022-07-06 PROCEDURE — 81003 URINALYSIS AUTO W/O SCOPE: CPT | Mod: QW

## 2022-07-06 PROCEDURE — 0502F SUBSEQUENT PRENATAL CARE: CPT

## 2022-07-19 ENCOUNTER — ASOB RESULT (OUTPATIENT)
Age: 32
End: 2022-07-19

## 2022-07-19 ENCOUNTER — OUTPATIENT (OUTPATIENT)
Dept: OUTPATIENT SERVICES | Facility: HOSPITAL | Age: 32
LOS: 1 days | Discharge: HOME | End: 2022-07-19

## 2022-07-19 ENCOUNTER — APPOINTMENT (OUTPATIENT)
Dept: ANTEPARTUM | Facility: CLINIC | Age: 32
End: 2022-07-19

## 2022-07-19 PROCEDURE — 76816 OB US FOLLOW-UP PER FETUS: CPT | Mod: 26

## 2022-07-26 ENCOUNTER — INPATIENT (INPATIENT)
Facility: HOSPITAL | Age: 32
LOS: 1 days | Discharge: HOME | End: 2022-07-28

## 2022-07-26 ENCOUNTER — APPOINTMENT (OUTPATIENT)
Dept: OBGYN | Facility: CLINIC | Age: 32
End: 2022-07-26

## 2022-07-26 ENCOUNTER — RESULT REVIEW (OUTPATIENT)
Age: 32
End: 2022-07-26

## 2022-07-26 VITALS — HEART RATE: 87 BPM | SYSTOLIC BLOOD PRESSURE: 110 MMHG | DIASTOLIC BLOOD PRESSURE: 71 MMHG

## 2022-07-26 DIAGNOSIS — Z90.79 ACQUIRED ABSENCE OF OTHER GENITAL ORGAN(S): Chronic | ICD-10-CM

## 2022-07-26 DIAGNOSIS — Z98.890 OTHER SPECIFIED POSTPROCEDURAL STATES: Chronic | ICD-10-CM

## 2022-07-26 LAB
AMPHET UR-MCNC: NEGATIVE — SIGNIFICANT CHANGE UP
APPEARANCE UR: CLEAR — SIGNIFICANT CHANGE UP
BACTERIA # UR AUTO: NEGATIVE — SIGNIFICANT CHANGE UP
BARBITURATES UR SCN-MCNC: NEGATIVE — SIGNIFICANT CHANGE UP
BASOPHILS # BLD AUTO: 0.04 K/UL — SIGNIFICANT CHANGE UP (ref 0–0.2)
BASOPHILS NFR BLD AUTO: 0.3 % — SIGNIFICANT CHANGE UP (ref 0–1)
BENZODIAZ UR-MCNC: NEGATIVE — SIGNIFICANT CHANGE UP
BILIRUB UR-MCNC: NEGATIVE — SIGNIFICANT CHANGE UP
BLD GP AB SCN SERPL QL: SIGNIFICANT CHANGE UP
BUPRENORPHINE SCREEN, URINE RESULT: NEGATIVE — SIGNIFICANT CHANGE UP
COCAINE METAB.OTHER UR-MCNC: NEGATIVE — SIGNIFICANT CHANGE UP
COLOR SPEC: SIGNIFICANT CHANGE UP
DIFF PNL FLD: ABNORMAL
EOSINOPHIL # BLD AUTO: 0.12 K/UL — SIGNIFICANT CHANGE UP (ref 0–0.7)
EOSINOPHIL NFR BLD AUTO: 0.9 % — SIGNIFICANT CHANGE UP (ref 0–8)
EPI CELLS # UR: 2 /HPF — SIGNIFICANT CHANGE UP (ref 0–5)
FENTANYL UR QL: NEGATIVE — SIGNIFICANT CHANGE UP
GLUCOSE UR QL: NEGATIVE — SIGNIFICANT CHANGE UP
HCT VFR BLD CALC: 34.8 % — LOW (ref 37–47)
HGB BLD-MCNC: 11.3 G/DL — LOW (ref 12–16)
HIV 1 & 2 AB SERPL IA.RAPID: SIGNIFICANT CHANGE UP
HYALINE CASTS # UR AUTO: 1 /LPF — SIGNIFICANT CHANGE UP (ref 0–7)
IMM GRANULOCYTES NFR BLD AUTO: 0.9 % — HIGH (ref 0.1–0.3)
KETONES UR-MCNC: NEGATIVE — SIGNIFICANT CHANGE UP
L&D DRUG SCREEN, URINE: SIGNIFICANT CHANGE UP
LEUKOCYTE ESTERASE UR-ACNC: NEGATIVE — SIGNIFICANT CHANGE UP
LYMPHOCYTES # BLD AUTO: 18.4 % — LOW (ref 20.5–51.1)
LYMPHOCYTES # BLD AUTO: 2.37 K/UL — SIGNIFICANT CHANGE UP (ref 1.2–3.4)
MCHC RBC-ENTMCNC: 28 PG — SIGNIFICANT CHANGE UP (ref 27–31)
MCHC RBC-ENTMCNC: 32.5 G/DL — SIGNIFICANT CHANGE UP (ref 32–37)
MCV RBC AUTO: 86.4 FL — SIGNIFICANT CHANGE UP (ref 81–99)
METHADONE UR-MCNC: NEGATIVE — SIGNIFICANT CHANGE UP
MONOCYTES # BLD AUTO: 1.09 K/UL — HIGH (ref 0.1–0.6)
MONOCYTES NFR BLD AUTO: 8.4 % — SIGNIFICANT CHANGE UP (ref 1.7–9.3)
NEUTROPHILS # BLD AUTO: 9.18 K/UL — HIGH (ref 1.4–6.5)
NEUTROPHILS NFR BLD AUTO: 71.1 % — SIGNIFICANT CHANGE UP (ref 42.2–75.2)
NITRITE UR-MCNC: NEGATIVE — SIGNIFICANT CHANGE UP
NRBC # BLD: 0 /100 WBCS — SIGNIFICANT CHANGE UP (ref 0–0)
OPIATES UR-MCNC: NEGATIVE — SIGNIFICANT CHANGE UP
OXYCODONE UR-MCNC: NEGATIVE — SIGNIFICANT CHANGE UP
PCP UR-MCNC: NEGATIVE — SIGNIFICANT CHANGE UP
PH UR: 7.5 — SIGNIFICANT CHANGE UP (ref 5–8)
PLATELET # BLD AUTO: 166 K/UL — SIGNIFICANT CHANGE UP (ref 130–400)
PRENATAL SYPHILIS TEST: SIGNIFICANT CHANGE UP
PROPOXYPHENE QUALITATIVE URINE RESULT: NEGATIVE — SIGNIFICANT CHANGE UP
PROT UR-MCNC: SIGNIFICANT CHANGE UP
RBC # BLD: 4.03 M/UL — LOW (ref 4.2–5.4)
RBC # FLD: 14.1 % — SIGNIFICANT CHANGE UP (ref 11.5–14.5)
RBC CASTS # UR COMP ASSIST: 27 /HPF — HIGH (ref 0–4)
SARS-COV-2 RNA SPEC QL NAA+PROBE: SIGNIFICANT CHANGE UP
SP GR SPEC: 1.02 — SIGNIFICANT CHANGE UP (ref 1.01–1.03)
UROBILINOGEN FLD QL: ABNORMAL
WBC # BLD: 12.91 K/UL — HIGH (ref 4.8–10.8)
WBC # FLD AUTO: 12.91 K/UL — HIGH (ref 4.8–10.8)
WBC UR QL: 2 /HPF — SIGNIFICANT CHANGE UP (ref 0–5)

## 2022-07-26 PROCEDURE — 59400 OBSTETRICAL CARE: CPT | Mod: U7

## 2022-07-26 PROCEDURE — 88307 TISSUE EXAM BY PATHOLOGIST: CPT | Mod: 26

## 2022-07-26 PROCEDURE — 0502F SUBSEQUENT PRENATAL CARE: CPT

## 2022-07-26 RX ORDER — OXYTOCIN 10 UNIT/ML
333.33 VIAL (ML) INJECTION
Qty: 20 | Refills: 0 | Status: DISCONTINUED | OUTPATIENT
Start: 2022-07-26 | End: 2022-07-26

## 2022-07-26 RX ORDER — BENZOCAINE 10 %
1 GEL (GRAM) MUCOUS MEMBRANE EVERY 6 HOURS
Refills: 0 | Status: DISCONTINUED | OUTPATIENT
Start: 2022-07-26 | End: 2022-07-28

## 2022-07-26 RX ORDER — IBUPROFEN 200 MG
600 TABLET ORAL EVERY 6 HOURS
Refills: 0 | Status: COMPLETED | OUTPATIENT
Start: 2022-07-26 | End: 2023-06-24

## 2022-07-26 RX ORDER — OXYTOCIN 10 UNIT/ML
333.33 VIAL (ML) INJECTION
Qty: 20 | Refills: 0 | Status: DISCONTINUED | OUTPATIENT
Start: 2022-07-26 | End: 2022-07-28

## 2022-07-26 RX ORDER — SODIUM CHLORIDE 9 MG/ML
3 INJECTION INTRAMUSCULAR; INTRAVENOUS; SUBCUTANEOUS EVERY 8 HOURS
Refills: 0 | Status: DISCONTINUED | OUTPATIENT
Start: 2022-07-26 | End: 2022-07-28

## 2022-07-26 RX ORDER — ACETAMINOPHEN 500 MG
975 TABLET ORAL
Refills: 0 | Status: DISCONTINUED | OUTPATIENT
Start: 2022-07-26 | End: 2022-07-28

## 2022-07-26 RX ORDER — DIBUCAINE 1 %
1 OINTMENT (GRAM) RECTAL EVERY 6 HOURS
Refills: 0 | Status: DISCONTINUED | OUTPATIENT
Start: 2022-07-26 | End: 2022-07-28

## 2022-07-26 RX ORDER — AMPICILLIN TRIHYDRATE 250 MG
2 CAPSULE ORAL ONCE
Refills: 0 | Status: COMPLETED | OUTPATIENT
Start: 2022-07-26 | End: 2022-07-26

## 2022-07-26 RX ORDER — SODIUM CHLORIDE 9 MG/ML
1000 INJECTION, SOLUTION INTRAVENOUS
Refills: 0 | Status: DISCONTINUED | OUTPATIENT
Start: 2022-07-26 | End: 2022-07-26

## 2022-07-26 RX ORDER — SIMETHICONE 80 MG/1
80 TABLET, CHEWABLE ORAL EVERY 4 HOURS
Refills: 0 | Status: DISCONTINUED | OUTPATIENT
Start: 2022-07-26 | End: 2022-07-28

## 2022-07-26 RX ORDER — OXYCODONE HYDROCHLORIDE 5 MG/1
5 TABLET ORAL ONCE
Refills: 0 | Status: DISCONTINUED | OUTPATIENT
Start: 2022-07-26 | End: 2022-07-28

## 2022-07-26 RX ORDER — PRAMOXINE HYDROCHLORIDE 150 MG/15G
1 AEROSOL, FOAM RECTAL EVERY 4 HOURS
Refills: 0 | Status: DISCONTINUED | OUTPATIENT
Start: 2022-07-26 | End: 2022-07-28

## 2022-07-26 RX ORDER — DIPHENHYDRAMINE HCL 50 MG
25 CAPSULE ORAL EVERY 6 HOURS
Refills: 0 | Status: DISCONTINUED | OUTPATIENT
Start: 2022-07-26 | End: 2022-07-28

## 2022-07-26 RX ORDER — LANOLIN
1 OINTMENT (GRAM) TOPICAL EVERY 6 HOURS
Refills: 0 | Status: DISCONTINUED | OUTPATIENT
Start: 2022-07-26 | End: 2022-07-28

## 2022-07-26 RX ORDER — TETANUS TOXOID, REDUCED DIPHTHERIA TOXOID AND ACELLULAR PERTUSSIS VACCINE, ADSORBED 5; 2.5; 8; 8; 2.5 [IU]/.5ML; [IU]/.5ML; UG/.5ML; UG/.5ML; UG/.5ML
0.5 SUSPENSION INTRAMUSCULAR ONCE
Refills: 0 | Status: DISCONTINUED | OUTPATIENT
Start: 2022-07-26 | End: 2022-07-28

## 2022-07-26 RX ORDER — KETOROLAC TROMETHAMINE 30 MG/ML
30 SYRINGE (ML) INJECTION ONCE
Refills: 0 | Status: DISCONTINUED | OUTPATIENT
Start: 2022-07-26 | End: 2022-07-26

## 2022-07-26 RX ORDER — HYDROCORTISONE 1 %
1 OINTMENT (GRAM) TOPICAL EVERY 6 HOURS
Refills: 0 | Status: DISCONTINUED | OUTPATIENT
Start: 2022-07-26 | End: 2022-07-28

## 2022-07-26 RX ORDER — AMPICILLIN TRIHYDRATE 250 MG
1 CAPSULE ORAL EVERY 4 HOURS
Refills: 0 | Status: DISCONTINUED | OUTPATIENT
Start: 2022-07-26 | End: 2022-07-26

## 2022-07-26 RX ORDER — MAGNESIUM HYDROXIDE 400 MG/1
30 TABLET, CHEWABLE ORAL
Refills: 0 | Status: DISCONTINUED | OUTPATIENT
Start: 2022-07-26 | End: 2022-07-28

## 2022-07-26 RX ORDER — AER TRAVELER 0.5 G/1
1 SOLUTION RECTAL; TOPICAL EVERY 4 HOURS
Refills: 0 | Status: DISCONTINUED | OUTPATIENT
Start: 2022-07-26 | End: 2022-07-28

## 2022-07-26 RX ORDER — OXYCODONE HYDROCHLORIDE 5 MG/1
5 TABLET ORAL
Refills: 0 | Status: DISCONTINUED | OUTPATIENT
Start: 2022-07-26 | End: 2022-07-28

## 2022-07-26 RX ADMIN — SODIUM CHLORIDE 3 MILLILITER(S): 9 INJECTION INTRAMUSCULAR; INTRAVENOUS; SUBCUTANEOUS at 22:57

## 2022-07-26 RX ADMIN — Medication 12 MILLIGRAM(S): at 14:57

## 2022-07-26 RX ADMIN — Medication 30 MILLIGRAM(S): at 18:38

## 2022-07-26 RX ADMIN — Medication 975 MILLIGRAM(S): at 19:55

## 2022-07-26 RX ADMIN — Medication 200 GRAM(S): at 14:51

## 2022-07-26 NOTE — OB PROVIDER DELIVERY SUMMARY - NSANESTHESIAVD_OBGYN_ALL_OB
Patient called asking if he could be given some medication to relax him for the cystoscopy on Feb 26, 2019. Dr. Leong said he will give the patient a prescription the day before the cysto to . I called patient and he will come by on the day before for this prescription.  
Epidural

## 2022-07-26 NOTE — OB PROVIDER H&P - HISTORY OF PRESENT ILLNESS
31 yr old  at 33.2wks GA sent by Dr Cordoba for evaluation of PROM.  Pt reports feeling ctx start this AM around 9 am, now ever 5 min, painful.  Reports feeling LOF at 11 am, clear at first then bloodtinged mucus.  Reports + FM, denies VB.

## 2022-07-26 NOTE — OB PROVIDER DELIVERY SUMMARY - NSPROVIDERDELIVERYNOTE_OBGYN_ALL_OB_FT
Patient was fully dilated and pushing. Head delivered OA, restituted to VEGA, Anterior shoulder delivered, followed by the posterior shoulder, rest of the body without complications. Baby suctioned, cord clamped and cut, and infant placed on mothers abdomen. Cord segment and blood obtained. Placenta delivered, intact. Fundus massaged and firm, with good hemostasis. Pitocin given postpartum. Vaginal vault, perineum, and cervix inspected, and second degree perineal laceration noted and repaired with 2-0 chromic in usual fashion. Live male infant delivered     Dr. Gomez at bedside

## 2022-07-26 NOTE — OB RN PATIENT PROFILE - FALL HARM RISK - UNIVERSAL INTERVENTIONS
Bed in lowest position, wheels locked, appropriate side rails in place/Call bell, personal items and telephone in reach/Instruct patient to call for assistance before getting out of bed or chair/Non-slip footwear when patient is out of bed/[place] to call system/Physically safe environment - no spills, clutter or unnecessary equipment/Purposeful Proactive Rounding/Room/bathroom lighting operational, light cord in reach

## 2022-07-26 NOTE — OB RN PATIENT PROFILE - PRO ANTIBODY SCREEN
Results   TSH   17 Apr 2017 05:30 PM  -   TSH: 2.651 mcUnits/mL  Reference Range: 0.350-5.000.  FSH/LH   17 Apr 2017 05:30 PM  -   FSH: 7.1 mUnits/mL  -   LH: 5.2 mUnits/mL.  Discussed   Labs were normal DR ZHOU.   negative

## 2022-07-26 NOTE — OB PROVIDER H&P - NSVAGDELIVDETA1_OBGYN_ALL_OB
November 20, 2018        Elly Levin MD  [address]  [place] [address]             [facility]  [address]  [facility] [address]  Phone: [phone]  Fax: [phone]   Patient: Maranda Charles   MR Number: [id number]   YOB: 2006   Date of Visit: 11/3/2018       Dear Dr. Levin:    Thank you for referring Maranda Charles to me for evaluation. Attached you will find relevant portions of my assessment and plan of care.    If you have questions, please do not hesitate to call me. I look forward to following Maranda Charles along with you.    Sincerely,      Layo Alfaro MD            CC  No Recipients    Enclosure         
Spontaneous vertex

## 2022-07-26 NOTE — PROCEDURE NOTE - ADDITIONAL PROCEDURE DETAILS
Epidural placed by LUIS MIGUEL Teague at L4-5.  Standard ASA monitors including FHR. Sterile gloves, chlorhexidine prep. 1% lidocaine for local infiltration. 17g touhy. IMELDA to saline @ 5.5 cm. Catheter threaded easily to 9 cm.  Touhy needle removed. Catheter secured in place. Negative aspiration. Test dose consisiting of 3ml 1.5% lidocaine with epinephrine was negative. 10ml 0.25% bupivacaine was given incrementally after negative aspiration. Patient tolerated procedure and was hemodynamically stable throughout. Patient did not receive pain relief so epidural was replaced by me (Dr. Luna) at L3-4:    Standard ASA monitors including FHR. Sterile gloves, betadine prep. 1% lidocaine for local infiltration. 17g touhy. IMELDA to saline @ 5 cm. Catheter threaded easily to 11cm.  Touhy needle removed. Catheter secured in place. Negative aspiration. Test dose consisiting of 3ml 1.5% lidocaine with epinephrine was negative. 10ml 0.25% bupivacaine was given incrementally after negative aspiration. Patient tolerated procedure and was hemodynamically stable throughout. T10 level bilaterally. Epidural infusion consisting of 250ml 0.0625% bupivacaine with fentanyl 2mcg/ml at 8ml/hr.

## 2022-07-26 NOTE — OB PROVIDER H&P - ASSESSMENT
31 yr old  at 33.2 wks, GBS Unknown, PROM, in labor    Admit to L&D  IV Hydration and labs  Continuous TOCO and EFM  Abx/celestone as ordered  Pain management prn  Peds aware  MFM consulted  Dr Castro informed    Fadumo Cordoba and Ana aware

## 2022-07-26 NOTE — OB PROVIDER H&P - NSHPPHYSICALEXAM_GEN_ALL_CORE
Gen: mild distress due to pain  Abd: gravid, soft NT  VE:4/80/-1, vtx by sono  FHR:140/mod/+accels  TOCO: every 5 min VS: 110/68  HR: 78 T: 98.6     Gen: mild distress due to pain  Abd: gravid, soft NT  VE:4/80/-1, vtx by sono  FHR:140/mod/+accels  TOCO: every 5 min

## 2022-07-26 NOTE — OB PROVIDER DELIVERY SUMMARY - NSSELHIDDEN_OBGYN_ALL_OB_FT
[NS_DeliveryAttending1_OBGYN_ALL_OB_FT:[id number]=],[NS_DeliveryRN_OBGYN_ALL_OB_FT:[email or web address]=]

## 2022-07-26 NOTE — OB PROVIDER H&P - NS_OBGYNHISTORY_OBGYN_ALL_OB_FT
x 1, FT Male: tiana Carlos- 7lbs 4 oz no complications    Gyn: right ovarian cystectomy/spalpingectomy

## 2022-07-26 NOTE — OB RN DELIVERY SUMMARY - NSSELHIDDEN_OBGYN_ALL_OB_FT
[NS_DeliveryAttending1_OBGYN_ALL_OB_FT:[id number]=],[NS_DeliveryRN_OBGYN_ALL_OB_FT:[email or web address]=] [NS_DeliveryAttending1_OBGYN_ALL_OB_FT:[id number]=],[NS_DeliveryRN_OBGYN_ALL_OB_FT:[email or web address]=],[NS_DeliveryAssist1_OBGYN_ALL_OB_FT:[id number]=]

## 2022-07-26 NOTE — OB PROVIDER H&P - NSCHILDCOND1_OBGYN_ALL_OB
I spoke to Radha today regarding her B/P. The patient stated she has spoke to her PCP who was in touch with her Renal Doctor. The patient stated the plan was to recheck her B/P on Tuesday, 6/8/21 when she comes to clinic for her COVID test.  
Living

## 2022-07-27 LAB
BASOPHILS # BLD AUTO: 0.03 K/UL — SIGNIFICANT CHANGE UP (ref 0–0.2)
BASOPHILS NFR BLD AUTO: 0.2 % — SIGNIFICANT CHANGE UP (ref 0–1)
EOSINOPHIL # BLD AUTO: 0 K/UL — SIGNIFICANT CHANGE UP (ref 0–0.7)
EOSINOPHIL NFR BLD AUTO: 0 % — SIGNIFICANT CHANGE UP (ref 0–8)
HCT VFR BLD CALC: 28.3 % — LOW (ref 37–47)
HGB BLD-MCNC: 9.4 G/DL — LOW (ref 12–16)
IMM GRANULOCYTES NFR BLD AUTO: 0.9 % — HIGH (ref 0.1–0.3)
LYMPHOCYTES # BLD AUTO: 12.7 % — LOW (ref 20.5–51.1)
LYMPHOCYTES # BLD AUTO: 2.35 K/UL — SIGNIFICANT CHANGE UP (ref 1.2–3.4)
MCHC RBC-ENTMCNC: 28.5 PG — SIGNIFICANT CHANGE UP (ref 27–31)
MCHC RBC-ENTMCNC: 33.2 G/DL — SIGNIFICANT CHANGE UP (ref 32–37)
MCV RBC AUTO: 85.8 FL — SIGNIFICANT CHANGE UP (ref 81–99)
MONOCYTES # BLD AUTO: 1.36 K/UL — HIGH (ref 0.1–0.6)
MONOCYTES NFR BLD AUTO: 7.4 % — SIGNIFICANT CHANGE UP (ref 1.7–9.3)
NEUTROPHILS # BLD AUTO: 14.57 K/UL — HIGH (ref 1.4–6.5)
NEUTROPHILS NFR BLD AUTO: 78.8 % — HIGH (ref 42.2–75.2)
NRBC # BLD: 0 /100 WBCS — SIGNIFICANT CHANGE UP (ref 0–0)
PLATELET # BLD AUTO: 191 K/UL — SIGNIFICANT CHANGE UP (ref 130–400)
RBC # BLD: 3.3 M/UL — LOW (ref 4.2–5.4)
RBC # FLD: 14 % — SIGNIFICANT CHANGE UP (ref 11.5–14.5)
WBC # BLD: 18.47 K/UL — HIGH (ref 4.8–10.8)
WBC # FLD AUTO: 18.47 K/UL — HIGH (ref 4.8–10.8)

## 2022-07-27 RX ORDER — IBUPROFEN 200 MG
600 TABLET ORAL EVERY 6 HOURS
Refills: 0 | Status: DISCONTINUED | OUTPATIENT
Start: 2022-07-27 | End: 2022-07-28

## 2022-07-27 RX ADMIN — Medication 975 MILLIGRAM(S): at 03:01

## 2022-07-27 RX ADMIN — Medication 1 TABLET(S): at 11:03

## 2022-07-27 RX ADMIN — SODIUM CHLORIDE 3 MILLILITER(S): 9 INJECTION INTRAMUSCULAR; INTRAVENOUS; SUBCUTANEOUS at 15:35

## 2022-07-27 RX ADMIN — SODIUM CHLORIDE 3 MILLILITER(S): 9 INJECTION INTRAMUSCULAR; INTRAVENOUS; SUBCUTANEOUS at 22:12

## 2022-07-27 RX ADMIN — Medication 600 MILLIGRAM(S): at 17:11

## 2022-07-27 RX ADMIN — Medication 975 MILLIGRAM(S): at 21:05

## 2022-07-27 RX ADMIN — Medication 600 MILLIGRAM(S): at 06:01

## 2022-07-27 RX ADMIN — Medication 975 MILLIGRAM(S): at 17:12

## 2022-07-27 RX ADMIN — Medication 975 MILLIGRAM(S): at 22:12

## 2022-07-27 RX ADMIN — Medication 975 MILLIGRAM(S): at 02:26

## 2022-07-27 RX ADMIN — Medication 975 MILLIGRAM(S): at 16:26

## 2022-07-27 RX ADMIN — SODIUM CHLORIDE 3 MILLILITER(S): 9 INJECTION INTRAMUSCULAR; INTRAVENOUS; SUBCUTANEOUS at 06:01

## 2022-07-27 RX ADMIN — Medication 600 MILLIGRAM(S): at 11:03

## 2022-07-27 RX ADMIN — Medication 600 MILLIGRAM(S): at 17:40

## 2022-07-28 ENCOUNTER — TRANSCRIPTION ENCOUNTER (OUTPATIENT)
Age: 32
End: 2022-07-28

## 2022-07-28 VITALS
DIASTOLIC BLOOD PRESSURE: 59 MMHG | SYSTOLIC BLOOD PRESSURE: 97 MMHG | HEART RATE: 63 BPM | RESPIRATION RATE: 18 BRPM | TEMPERATURE: 98 F

## 2022-07-28 LAB
GROUP B BETA STREP DNA (PCR): SIGNIFICANT CHANGE UP
GROUP B BETA STREP INTERPRETATION: SIGNIFICANT CHANGE UP
SOURCE GROUP B STREP: SIGNIFICANT CHANGE UP
SURGICAL PATHOLOGY STUDY: SIGNIFICANT CHANGE UP

## 2022-07-28 PROCEDURE — 99238 HOSP IP/OBS DSCHRG MGMT 30/<: CPT

## 2022-07-28 RX ORDER — IBUPROFEN 200 MG
1 TABLET ORAL
Qty: 0 | Refills: 0 | DISCHARGE
Start: 2022-07-28

## 2022-07-28 RX ORDER — DOCUSATE SODIUM 100 MG
1 CAPSULE ORAL
Qty: 30 | Refills: 0
Start: 2022-07-28 | End: 2022-08-26

## 2022-07-28 RX ORDER — FERROUS SULFATE 325(65) MG
1 TABLET ORAL
Qty: 60 | Refills: 0
Start: 2022-07-28 | End: 2022-08-26

## 2022-07-28 RX ORDER — IBUPROFEN 200 MG
1 TABLET ORAL
Qty: 28 | Refills: 0
Start: 2022-07-28 | End: 2022-08-03

## 2022-07-28 RX ADMIN — Medication 1 TABLET(S): at 13:17

## 2022-07-28 RX ADMIN — Medication 975 MILLIGRAM(S): at 16:11

## 2022-07-28 RX ADMIN — Medication 600 MILLIGRAM(S): at 13:48

## 2022-07-28 RX ADMIN — SODIUM CHLORIDE 3 MILLILITER(S): 9 INJECTION INTRAMUSCULAR; INTRAVENOUS; SUBCUTANEOUS at 06:09

## 2022-07-28 RX ADMIN — Medication 600 MILLIGRAM(S): at 00:09

## 2022-07-28 RX ADMIN — Medication 975 MILLIGRAM(S): at 08:35

## 2022-07-28 RX ADMIN — Medication 600 MILLIGRAM(S): at 13:18

## 2022-07-28 RX ADMIN — Medication 975 MILLIGRAM(S): at 09:05

## 2022-07-28 RX ADMIN — Medication 600 MILLIGRAM(S): at 06:19

## 2022-07-28 RX ADMIN — Medication 600 MILLIGRAM(S): at 01:00

## 2022-07-28 NOTE — DISCHARGE NOTE OB - PHYSICIAN SECTION COMPLETE
Impression:  1) Diarrhea - with previous negative work up inpatient.  Etiologies include infectious vs inflammatory vs  malabsorptive vs microscopic colitis.  Patient will likely ultimately need endoscopic evaluation but would first repeat stool work up prior to proceeding. Stool C. Diff negative, now with AMS.    Recommendations:   - f/u stool O and P, send giardia   - f/u stool electrolytes (Na+, K+)   - f/u stool elastase   - f/u qualitative fecal fat   - f/u stool a-1 antitrypsin    -follow-up CBC; Fe++/TIBC. B12, foalte   - f/u celiac panel including tissue trans glutaminase IgA and IgA    - monitor stool output and volume   - lactose free LRD   - possible colonoscopy pending above for CMV and microscopic colitis however need patients mental status to improve prior to given anesthesia for colonoscopy    [place], PGY-4  Gastroenterology Fellow Yes Impression:  1) Diarrhea - with previous negative work up inpatient.  Etiologies include infectious vs inflammatory vs  malabsorptive vs microscopic colitis.  Patient will likely ultimately need endoscopic evaluation but would first repeat stool work up prior to proceeding. Stool C. Diff negative, now with AMS.    Recommendations:   - f/u stool O and P, send giardia   - f/u stool electrolytes (Na+, K+)   - f/u stool elastase   - f/u qualitative fecal fat   - f/u stool a-1 antitrypsin    -follow-up CBC; Fe++/TIBC. B12, foalte   - f/u celiac panel including tissue trans glutaminase IgA and IgA    - monitor stool output and volume   - lactose free LRD   - will attempt un-sedated flexible sigmoidoscopy    [place], PGY-4  Gastroenterology Fellow

## 2022-07-28 NOTE — PROGRESS NOTE ADULT - SUBJECTIVE AND OBJECTIVE BOX
Pt ppd#2  pt has no complaints  + ambulating, + po intake  exam: abd-soft  vag: no heavy bleeding  discharge instructions/precautions given
32 yo p1102 s/p     arrived after patient delivered , uncomplicated, 2nd degree vaginal laceration    baby boy  5'+ lb   2625gm   apgar's 9/9    pe wnl fundus firm at umbilichus  lochia min  extr nt ne   toradol Im for pain  a/p 32 yo p2 s/p  ppd 0   doing well  cont postpartum care
30 yo p1102 s/p  at 33.2 wks   doing well  cramping when breast pumping  minimal bleeding   vss  nl heart and lungs exam   abd: soft nd, min tender, fundus firm below umbilicus  lochia -  min  extr nt ne    cbc pre 12.9>11/34<166 o ps  a/p 30 yo p2 s/p  33.2 wks pprom  doing well  cbc pending  plan d/c tomm  baby in [facility] for observation, on ra  plan circ

## 2022-07-28 NOTE — DISCHARGE NOTE OB - MEDICATION SUMMARY - MEDICATIONS TO TAKE
I will START or STAY ON the medications listed below when I get home from the hospital:    ibuprofen 600 mg oral tablet  -- 1 tab(s) by mouth every 6 hours  -- Indication: For pain   I will START or STAY ON the medications listed below when I get home from the hospital:    ibuprofen 600 mg oral tablet  -- 1 tab(s) by mouth every 6 hours  -- Indication: For pain    ferrous sulfate 325 mg (65 mg elemental iron) oral tablet  -- 1 tab(s) by mouth 2 times a day   -- Check with your doctor before becoming pregnant.  Do not chew, break, or crush.  May discolor urine or feces.    -- Indication: For anemia    Colace 100 mg oral capsule  -- 1 cap(s) by mouth once a day   -- Medication should be taken with plenty of water.    -- Indication: For constipation

## 2022-07-28 NOTE — DISCHARGE NOTE OB - PATIENT PORTAL LINK FT
You can access the FollowMyHealth Patient Portal offered by [facility] by registering at the following website: [email or web address]. By joining [employer]’s FollowMyHealth portal, you will also be able to view your health information using other applications (apps) compatible with our system.

## 2022-07-28 NOTE — DISCHARGE NOTE OB - CARE PROVIDER_API CALL
Summer Cordoba)  Obstetrics and Gynecology  [address] [place] [address], [address]  [place] [address]  Phone: [phone]  Fax: [phone]  Established Patient  Follow Up Time:

## 2022-07-29 LAB
A VAGINAE DNA VAG QL NAA+PROBE: SIGNIFICANT CHANGE UP
BVAB2 DNA VAG QL NAA+PROBE: SIGNIFICANT CHANGE UP
C ALBICANS DNA VAG QL NAA+PROBE: NEGATIVE — SIGNIFICANT CHANGE UP
C GLABRATA DNA VAG QL NAA+PROBE: NEGATIVE — SIGNIFICANT CHANGE UP
C KRUSEI DNA VAG QL NAA+PROBE: NEGATIVE — SIGNIFICANT CHANGE UP
C LUSITANIAE DNA VAG QL NAA+PROBE: NEGATIVE — SIGNIFICANT CHANGE UP
C TRACH RRNA SPEC QL NAA+PROBE: SIGNIFICANT CHANGE UP
MEGA1 DNA VAG QL NAA+PROBE: SIGNIFICANT CHANGE UP
N GONORRHOEA RRNA SPEC QL NAA+PROBE: SIGNIFICANT CHANGE UP
T VAGINALIS RRNA SPEC QL NAA+PROBE: SIGNIFICANT CHANGE UP

## 2022-08-01 DIAGNOSIS — Z3A.33 33 WEEKS GESTATION OF PREGNANCY: ICD-10-CM

## 2022-08-01 DIAGNOSIS — Z20.822 CONTACT WITH AND (SUSPECTED) EXPOSURE TO COVID-19: ICD-10-CM

## 2022-08-01 NOTE — ED PROVIDER NOTE - PHYSICAL EXAMINATION
sorethroat x couple of days
Physical Exam    Vital Signs: I have reviewed the initial vital signs.  Constitutional: well-nourished, appears stated age, no acute distress  Eyes: Conjunctiva pink, Sclera clear  Cardiovascular: S1 and S2, regular rate, regular rhythm, well-perfused extremities, radial pulses equal and 2+ b/l.   Respiratory: unlabored respiratory effort, clear to auscultation bilaterally no wheezing, rales and rhonchi. pt is speaking full sentences. no accessory muscle use.   Gastrointestinal: soft, (+) epigastric tenderness, nondistended abdomen, no pulsatile mass, normal bowl sounds, no rebound, no guarding, negative psoas, negative obturator, negative murphys. no organomegaly. no cva tenderness.   Musculoskeletal: supple neck, no lower extremity edema, no calf tenderness  Integumentary: warm, dry, no rash  Neurologic: awake, alert  Psychiatric: appropriate mood, appropriate affect

## 2022-08-02 PROBLEM — Z78.9 OTHER SPECIFIED HEALTH STATUS: Chronic | Status: ACTIVE | Noted: 2022-07-26

## 2022-08-16 ENCOUNTER — APPOINTMENT (OUTPATIENT)
Dept: ANTEPARTUM | Facility: CLINIC | Age: 32
End: 2022-08-16

## 2022-08-18 NOTE — ED ADULT TRIAGE NOTE - SOURCE OF INFORMATION
Patient O-T Plasty Text: The defect edges were debeveled with a #15 scalpel blade.  Given the location of the defect, shape of the defect and the proximity to free margins an O-T plasty was deemed most appropriate.  Using a sterile surgical marker, an appropriate O-T plasty was drawn incorporating the defect and placing the expected incisions within the relaxed skin tension lines where possible.    The area thus outlined was incised deep to adipose tissue with a #15 scalpel blade.  The skin margins were undermined to an appropriate distance in all directions utilizing iris scissors.

## 2022-09-08 ENCOUNTER — APPOINTMENT (OUTPATIENT)
Dept: OBGYN | Facility: CLINIC | Age: 32
End: 2022-09-08

## 2022-09-08 VITALS
HEART RATE: 65 BPM | SYSTOLIC BLOOD PRESSURE: 96 MMHG | TEMPERATURE: 98 F | DIASTOLIC BLOOD PRESSURE: 60 MMHG | HEIGHT: 61 IN | WEIGHT: 110 LBS | BODY MASS INDEX: 20.77 KG/M2

## 2022-09-08 LAB
HCG UR QL: NEGATIVE
QUALITY CONTROL: YES

## 2022-09-08 PROCEDURE — 81025 URINE PREGNANCY TEST: CPT

## 2022-09-08 PROCEDURE — 96372 THER/PROPH/DIAG INJ SC/IM: CPT

## 2022-09-08 PROCEDURE — 0503F POSTPARTUM CARE VISIT: CPT | Mod: 25

## 2022-09-08 RX ORDER — MEDROXYPROGESTERONE ACETATE 150 MG/ML
150 INJECTION, SUSPENSION INTRAMUSCULAR
Qty: 0 | Refills: 0 | Status: COMPLETED | OUTPATIENT
Start: 2022-09-08

## 2022-09-08 RX ADMIN — MEDROXYPROGESTERONE ACETATE 0 MG/ML: 150 INJECTION, SUSPENSION INTRAMUSCULAR at 00:00

## 2022-09-08 NOTE — HISTORY OF PRESENT ILLNESS
[Postpartum Follow Up] : postpartum follow up [Last Pap Date: ___] : Last Pap Date: [unfilled] [Delivery Date: ___] : on [unfilled] [] : delivered by vaginal delivery [NICU: ___] : NICU: [unfilled] [Resumed [place]] : has resumed intercourse [Breastfeeding] : not currently nursing [Resumed Menses] : has not resumed her menses [Intended Contraception] : the patient does not intended to use contraception postpartum [BreastFeeding Problems] : no breastfeeding problems [Back to Normal] : is back to normal in size [Normal] : the vagina was normal [Healing Well] : is not healing well [Cervix Sample Taken] : cervical sample taken for a Pap smear [Examination Of The Breasts] : breasts are normal [Doing Well] : is doing well [No Sign of Infection] : is showing no signs of infection [None] : None [FreeTextEntry8] : Status post , doing well [de-identified] :  labor [de-identified] : Patient stopped breast-feeding,  [de-identified] : She is doing okay [de-identified] : Normal external genitalia, no discharge, no CMT, no uterine tenderness, no adnexal [de-identified] : 31-year-old para 1-1-0-2 , status post  delivery baby boy 5 pounds 12 ounces on , no depression [de-identified] : Patient requesting contraception.  Options discussed.  Depo-Provera injection given into the left gluteus, Pap HPV, gc/chl, RTO 3 months

## 2022-09-12 LAB
C TRACH RRNA SPEC QL NAA+PROBE: NOT DETECTED
HPV HIGH+LOW RISK DNA PNL CVX: NOT DETECTED
N GONORRHOEA RRNA SPEC QL NAA+PROBE: NOT DETECTED
SOURCE AMPLIFICATION: NORMAL

## 2022-09-13 ENCOUNTER — APPOINTMENT (OUTPATIENT)
Dept: ANTEPARTUM | Facility: CLINIC | Age: 32
End: 2022-09-13

## 2022-09-13 LAB
SOURCE AMPLIFICATION: NORMAL
T VAGINALIS RRNA SPEC QL NAA+PROBE: NOT DETECTED

## 2022-09-20 DIAGNOSIS — B37.3 CANDIDIASIS OF VULVA AND VAGINA: ICD-10-CM

## 2022-09-20 RX ORDER — FLUCONAZOLE 150 MG/1
150 TABLET ORAL
Qty: 1 | Refills: 0 | Status: ACTIVE | COMMUNITY
Start: 2022-09-20 | End: 1900-01-01

## 2022-12-08 ENCOUNTER — APPOINTMENT (OUTPATIENT)
Dept: OBGYN | Facility: CLINIC | Age: 32
End: 2022-12-08

## 2023-03-23 NOTE — OB PROVIDER H&P - PRO BLOOD TYPE INFANT
This is a 58-year-old male with a history of hypertension, hyperlipidemia, prediabetes, sleep apnea, hepatic steatosis, GERD, Wetzel's esophagus, and adenomatous colon polyps due surveillance in June 2024 presenting for follow-up regarding Wetezl's esophagus. He was last seen 6/28/2021 for a surveillance colonoscopy due to a prior history of adenomatous colon polyps notable for BBPS 9 (Suprep), sigmoid and descending diverticulosis, mild grade 1 internal hemorrhoids, removal of 2 semipedunculated transverse and ascending 9-11 mm polyps, normal terminal ileum.  Pathology: Transverse polyp was inflammatory and ascending polyp was a sessile serrated adenoma.  Surveillance recommended in 3 years. He is taking omeprazole daily.  His symptoms are well controlled.  If he forgets to take medication, by the second day, he is symptomatic.  He denies dysphagia, abdominal pain, nausea, or any other abdominal symptoms.
O positive

## 2023-11-25 ENCOUNTER — EMERGENCY (EMERGENCY)
Facility: HOSPITAL | Age: 33
LOS: 0 days | Discharge: HOME | End: 2023-11-25
Attending: STUDENT IN AN ORGANIZED HEALTH CARE EDUCATION/TRAINING PROGRAM
Payer: MEDICAID

## 2023-11-25 VITALS
DIASTOLIC BLOOD PRESSURE: 53 MMHG | HEART RATE: 82 BPM | TEMPERATURE: 99 F | WEIGHT: 110.01 LBS | RESPIRATION RATE: 16 BRPM | OXYGEN SATURATION: 99 % | SYSTOLIC BLOOD PRESSURE: 105 MMHG

## 2023-11-25 DIAGNOSIS — Z90.79 ACQUIRED ABSENCE OF OTHER GENITAL ORGAN(S): Chronic | ICD-10-CM

## 2023-11-25 DIAGNOSIS — Z98.890 OTHER SPECIFIED POSTPROCEDURAL STATES: Chronic | ICD-10-CM
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
## 2023-11-25 DIAGNOSIS — M25.562 PAIN IN LEFT KNEE: ICD-10-CM
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
## 2023-11-25 PROCEDURE — 99283 EMERGENCY DEPT VISIT LOW MDM: CPT | Mod: 25

## 2023-11-25 PROCEDURE — 73562 X-RAY EXAM OF KNEE 3: CPT | Mod: 26,LT

## 2023-11-25 PROCEDURE — 73562 X-RAY EXAM OF KNEE 3: CPT | Mod: LT

## 2023-11-25 PROCEDURE — 99284 EMERGENCY DEPT VISIT MOD MDM: CPT

## 2023-11-25 RX ORDER — IBUPROFEN 200 MG
600 TABLET ORAL ONCE
Refills: 0 | Status: COMPLETED | OUTPATIENT
Start: 2023-11-25 | End: 2023-11-25

## 2023-11-25 RX ADMIN — Medication 600 MILLIGRAM(S): at 20:07

## 2023-11-25 NOTE — ED PROVIDER NOTE - CLINICAL SUMMARY MEDICAL DECISION MAKING FREE TEXT BOX
32-year-old female with no past medical history who presents to the ED complaining of left knee pain x 3 days.  Patient states she hit her knee on a ladder while at work 3 days ago. Pain is most localized to the top of the knee, no exacebating/alleviating factors, intermittent, nonradiating, mild in severity.     On exam, VSS. Mild TTP to the suprapatellar tendon, no bony TTP. FROM. Extensor mechanism intact. No foot drop. Normal pulses, strength, and sensation throughout extremities. No calf edema or TTP suggestive of DVT, no pain of proportion suggestive of compartment syndrome, no overlying skin changes suggestive of trauma or cellulitis. No varus/valgus laxity, negative anterior/posterior drawer, negative lachman and mcmurrays.     XR unremarkable. Recommended supportive care. Outpt follow up.     Patient to be discharged from ED. Any available test results were discussed with patient and/or family. Verbal instructions given, including instructions to return to ED immediately for any new, worsening, or concerning symptoms. Patient endorsed understanding. Written discharge instructions additionally given, including follow-up plan.

## 2023-11-25 NOTE — ED PROVIDER NOTE - PATIENT PORTAL LINK FT
You can access the FollowMyHealth Patient Portal offered by [facility] by registering at the following website: [email or web address]. By joining [employer]’s FollowMyHealth portal, you will also be able to view your health information using other applications (apps) compatible with our system.

## 2023-11-25 NOTE — ED PROVIDER NOTE - PHYSICAL EXAMINATION
VITAL SIGNS: I have reviewed nursing notes and confirm.  CONSTITUTIONAL: well-appearing, NAD  SKIN: Warm dry  HEAD: Normocephalic  EYES: EOMI  ENT: Moist mucous membranes  NECK: Supple  CARD: RRR, no murmurs, rubs or gallops  RESP: clear to ausculation b/l.  No rales, rhonchi, or wheezing.  EXT: Full ROM, + mild tenderness to palpation over L suprapatellar tendon, no pedal edema, no calf tenderness  NEURO: normal motor. normal sensory. Normal gait.  PSYCH: Cooperative, appropriate.

## 2023-11-25 NOTE — ED ADULT NURSE NOTE - SUICIDE SCREENING DEPRESSION
Encounter addended by: Felipe Spring MD on: 9/24/2021 8:58 AM   Actions taken: Clinical Note Signed
Negative

## 2023-11-25 NOTE — ED PROVIDER NOTE - OBJECTIVE STATEMENT
Patient is a 32-year-old female with no past medical history who presents to the ED complaining of left knee pain x 3 days.  Patient states she hit her knee on a ladder while at work 3 days ago Patient is a 32-year-old female with no past medical history who presents to the ED complaining of left knee pain x 3 days.  Patient states she hit her knee on a ladder while at work 3 days ago. Reports intermittent, non-radiating pain to top of the knee. Denies swelling to the knee or difficulty with ambulation. Denies fevers, chills, numbness/tingling in the extremities, leg swelling or calf pain.

## 2023-11-25 NOTE — ED PROVIDER NOTE - NSFOLLOWUPINSTRUCTIONS_ED_ALL_ED_FT
You can take ibuprofen 600mg every 6-8 hours as needed. This is an over-the-counter medication you may purchase without a prescription.    Acute Knee Pain, Adult    Many things can cause knee pain. Sometimes, knee pain is sudden (acute) and may be caused by damage, swelling, or irritation of the muscles and tissues that support your knee.  The pain often goes away on its own with time and rest. If the pain does not go away, tests may be done to find out what is causing the pain.    Follow these instructions at home:  If you have a knee sleeve or brace:   - Wear the knee sleeve or brace as told by your doctor. Take it off only as told by your doctor.  - Loosen it if your toes:  -->  Tingle.  --> Become numb.  --> Turn cold and blue.  --> Keep it clean.  - If the knee sleeve or brace is not waterproof:  --> Do not let it get wet.  --> Cover it with a watertight covering when you take a bath or shower.    Activity   - Rest your knee.  - Do not do things that cause pain or make pain worse.  - Avoid activities where both feet leave the ground at the same time (high-impact activities). Examples are running, jumping rope, and doing jumping jacks.  - Work with a physical therapist to make a safe exercise program, as told by your doctor.    Managing pain, stiffness, and swelling   - If told, put ice on the knee. To do this:  --> If you have a removable knee sleeve or brace, take it off as told by your doctor.  --> Put ice in a plastic bag.  --> Place a towel between your skin and the bag.  --> Leave the ice on for 20 minutes, 2–3 times a day.  --> Take off the ice if your skin turns bright red. This is very important. If you cannot feel pain, heat, or cold, you have a greater risk of damage to the area.  - If told, use an elastic bandage to put pressure (compression) on your injured knee.  - Raise your knee above the level of your heart while you are sitting or lying down.  - Sleep with a pillow under your knee.    General instructions   - Take over-the-counter and prescription medicines only as told by your doctor.  - Do not smoke or use any products that contain nicotine or tobacco. If you need help quitting, ask your doctor.  - If you are overweight, work with your doctor and a food expert (dietitian) to set goals to lose weight. Being overweight can make your knee hurt more.  - Watch for any changes in your symptoms.  - Keep all follow-up visits.    Contact a doctor if:  - The knee pain does not stop.  - The knee pain changes or gets worse.  - You have a fever along with knee pain.  - Your knee is red or feels warm when you touch it.  - Your knee gives out or locks up.    Get help right away if:  - Your knee swells, and the swelling gets worse.  - You cannot move your knee.  - You have very bad knee pain that does not get better with pain medicine.    Summary  - Many things can cause knee pain. The pain often goes away on its own with time and rest.  - Your doctor may do tests to find out the cause of the pain.  - Watch for any changes in your symptoms. Relieve your pain with rest, medicines, light activity, and use of ice.  - Get help right away if you cannot move your knee or your knee pain is very bad.

## 2023-11-28 ENCOUNTER — APPOINTMENT (OUTPATIENT)
Dept: OBGYN | Facility: CLINIC | Age: 33
End: 2023-11-28
Payer: MEDICAID

## 2023-11-28 VITALS
HEIGHT: 61 IN | DIASTOLIC BLOOD PRESSURE: 70 MMHG | SYSTOLIC BLOOD PRESSURE: 102 MMHG | WEIGHT: 110 LBS | BODY MASS INDEX: 20.77 KG/M2 | HEART RATE: 71 BPM

## 2023-11-28 DIAGNOSIS — Z01.419 ENCOUNTER FOR GYNECOLOGICAL EXAMINATION (GENERAL) (ROUTINE) W/OUT ABNORMAL FINDINGS: ICD-10-CM

## 2023-11-28 DIAGNOSIS — Z11.3 ENCOUNTER FOR SCREENING FOR INFECTIONS WITH A PREDOMINANTLY SEXUAL MODE OF TRANSMISSION: ICD-10-CM

## 2023-11-28 PROCEDURE — 99395 PREV VISIT EST AGE 18-39: CPT

## 2023-11-29 LAB
HCG UR QL: NEGATIVE
QUALITY CONTROL: YES

## 2023-11-30 LAB
C TRACH RRNA SPEC QL NAA+PROBE: NOT DETECTED
HPV HIGH+LOW RISK DNA PNL CVX: NOT DETECTED
N GONORRHOEA RRNA SPEC QL NAA+PROBE: NOT DETECTED
SOURCE AMPLIFICATION: NORMAL
SOURCE AMPLIFICATION: NORMAL
T VAGINALIS RRNA SPEC QL NAA+PROBE: NOT DETECTED

## 2023-12-08 LAB — CYTOLOGY CVX/VAG DOC THIN PREP: NORMAL

## 2024-01-04 ENCOUNTER — APPOINTMENT (OUTPATIENT)
Dept: OBGYN | Facility: CLINIC | Age: 34
End: 2024-01-04
Payer: MEDICAID

## 2024-01-04 ENCOUNTER — OUTPATIENT (OUTPATIENT)
Dept: OUTPATIENT SERVICES | Facility: HOSPITAL | Age: 34
LOS: 1 days | End: 2024-01-04
Payer: MEDICAID

## 2024-01-04 VITALS
WEIGHT: 113.13 LBS | SYSTOLIC BLOOD PRESSURE: 101 MMHG | HEIGHT: 61 IN | BODY MASS INDEX: 21.36 KG/M2 | DIASTOLIC BLOOD PRESSURE: 69 MMHG

## 2024-01-04 DIAGNOSIS — Z98.890 OTHER SPECIFIED POSTPROCEDURAL STATES: Chronic | ICD-10-CM

## 2024-01-04 DIAGNOSIS — Z90.79 ACQUIRED ABSENCE OF OTHER GENITAL ORGAN(S): Chronic | ICD-10-CM

## 2024-01-04 DIAGNOSIS — Z30.09 ENCOUNTER FOR OTHER GENERAL COUNSELING AND ADVICE ON CONTRACEPTION: ICD-10-CM

## 2024-01-04 DIAGNOSIS — Z3A.25 25 WEEKS GESTATION OF PREGNANCY: ICD-10-CM

## 2024-01-04 DIAGNOSIS — Z30.013 ENCOUNTER FOR INITIAL PRESCRIPTION OF INJECTABLE CONTRACEPTIVE: ICD-10-CM

## 2024-01-04 DIAGNOSIS — Z30.017 ENCOUNTER FOR INITIAL PRESCRIPTION OF IMPLANTABLE SUBDERMAL CONTRACEPTIVE: ICD-10-CM

## 2024-01-04 DIAGNOSIS — Z3A.16 16 WEEKS GESTATION OF PREGNANCY: ICD-10-CM

## 2024-01-04 DIAGNOSIS — Z32.01 ENCOUNTER FOR PREGNANCY TEST, RESULT POSITIVE: ICD-10-CM

## 2024-01-04 PROCEDURE — 99212 OFFICE O/P EST SF 10 MIN: CPT

## 2024-01-04 PROCEDURE — 96372 THER/PROPH/DIAG INJ SC/IM: CPT

## 2024-01-04 RX ORDER — MEDROXYPROGESTERONE ACETATE 150 MG/ML
150 INJECTION, SUSPENSION INTRAMUSCULAR
Refills: 0 | Status: COMPLETED | OUTPATIENT
Start: 2024-01-04

## 2024-01-04 RX ADMIN — MEDROXYPROGESTERONE ACETATE 0 MG/ML: 150 INJECTION, SUSPENSION INTRAMUSCULAR at 00:00

## 2024-01-04 NOTE — HISTORY OF PRESENT ILLNESS
[FreeTextEntry1] : 32 y/o P2  LMP 1/2/23 here for Nexplanon placement  GYN- Hx of abnormal pap +HPV- last pap normal

## 2024-01-04 NOTE — PLAN
[FreeTextEntry1] : Pt counseled on Nexplanon and she decide to use Depo instead- Depo was given- advise daily calcium supplementation F/U 3 months with her GYN

## 2024-01-08 LAB
HCG UR QL: NEGATIVE
QUALITY CONTROL: YES

## 2024-05-14 NOTE — OB RN DELIVERY SUMMARY - BABY A: APGAR 5 MIN HEART RATE, DELIVERY
(2) more than 100 beats/min Pt need appt for annual physical and med recheck. Please call pt and schedule appt with PCP. Rx sent x 30 days until seen in office.

## 2024-08-23 ENCOUNTER — EMERGENCY (EMERGENCY)
Facility: HOSPITAL | Age: 34
LOS: 0 days | Discharge: HOME | End: 2024-08-23
Attending: STUDENT IN AN ORGANIZED HEALTH CARE EDUCATION/TRAINING PROGRAM
Payer: MEDICAID

## 2024-08-23 VITALS
TEMPERATURE: 99 F | RESPIRATION RATE: 18 BRPM | OXYGEN SATURATION: 100 % | SYSTOLIC BLOOD PRESSURE: 100 MMHG | HEART RATE: 76 BPM | WEIGHT: 115.08 LBS | DIASTOLIC BLOOD PRESSURE: 69 MMHG

## 2024-08-23 DIAGNOSIS — Z90.79 ACQUIRED ABSENCE OF OTHER GENITAL ORGAN(S): Chronic | ICD-10-CM

## 2024-08-23 DIAGNOSIS — Z98.890 OTHER SPECIFIED POSTPROCEDURAL STATES: Chronic | ICD-10-CM

## 2024-08-23 DIAGNOSIS — Z20.822 CONTACT WITH AND (SUSPECTED) EXPOSURE TO COVID-19: ICD-10-CM

## 2024-08-23 DIAGNOSIS — R51.9 HEADACHE, UNSPECIFIED: ICD-10-CM

## 2024-08-23 DIAGNOSIS — J02.0 STREPTOCOCCAL PHARYNGITIS: ICD-10-CM

## 2024-08-23 DIAGNOSIS — R68.83 CHILLS (WITHOUT FEVER): ICD-10-CM

## 2024-08-23 DIAGNOSIS — R53.83 OTHER FATIGUE: ICD-10-CM

## 2024-08-23 LAB
FLUAV AG NPH QL: SIGNIFICANT CHANGE UP
FLUBV AG NPH QL: SIGNIFICANT CHANGE UP
RSV RNA NPH QL NAA+NON-PROBE: SIGNIFICANT CHANGE UP
SARS-COV-2 RNA SPEC QL NAA+PROBE: SIGNIFICANT CHANGE UP

## 2024-08-23 PROCEDURE — 96372 THER/PROPH/DIAG INJ SC/IM: CPT

## 2024-08-23 PROCEDURE — 99283 EMERGENCY DEPT VISIT LOW MDM: CPT | Mod: 25

## 2024-08-23 PROCEDURE — 99284 EMERGENCY DEPT VISIT MOD MDM: CPT

## 2024-08-23 PROCEDURE — 0241U: CPT

## 2024-08-23 RX ORDER — KETOROLAC TROMETHAMINE 10 MG
30 TABLET ORAL ONCE
Refills: 0 | Status: DISCONTINUED | OUTPATIENT
Start: 2024-08-23 | End: 2024-08-23

## 2024-08-23 RX ORDER — ACETAMINOPHEN 500 MG
2 TABLET ORAL
Qty: 40 | Refills: 0
Start: 2024-08-23 | End: 2024-08-27

## 2024-08-23 RX ORDER — DEXAMETHASONE 1.5 MG/1
10 TABLET ORAL ONCE
Refills: 0 | Status: COMPLETED | OUTPATIENT
Start: 2024-08-23 | End: 2024-08-23

## 2024-08-23 RX ORDER — AMOXICILLIN 500 MG
1 CAPSULE ORAL
Qty: 20 | Refills: 0
Start: 2024-08-23 | End: 2024-09-01

## 2024-08-23 RX ADMIN — Medication 30 MILLIGRAM(S): at 21:58

## 2024-08-23 RX ADMIN — DEXAMETHASONE 10 MILLIGRAM(S): 1.5 TABLET ORAL at 21:58

## 2024-08-23 RX ADMIN — Medication 1 TABLET(S): at 21:58

## 2024-08-23 NOTE — ED PROVIDER NOTE - ATTENDING APP SHARED VISIT CONTRIBUTION OF CARE
43-year-old female no medical history patient Yemi for evaluation multiple complaints  Patient states she has had bodyaches and sore throat for the past week and a headache for the past 3 days.  Associated chills without fever and fatigue.  Headache mild to moderate.  No focal numbness or weakness.  No neck pain or stiffness.  No recent travel.  No rash.  No chest pain, shortness of breath, abdominal pain, urinary complaints.    vss  gen- NAD, aaox3  card-rrr  lungs-ctab, no wheezing or rhonchi  abd-sntnd, no guarding or rebound  neuro- full str/sensation, cn ii-xii grossly intact, normal coordination and gait  HENT- no lip/tongue/uvula/tonsillar swelling, no exudates on tonsils however b/l pharyngeal erythema, uvula midline, base of tongue normal, pt tolerating secretions. no drooling, no stridor.  no hot potato voice

## 2024-08-23 NOTE — ED PROVIDER NOTE - ADDITIONAL NOTES AND INSTRUCTIONS:
Ms Stratton was seen in the Emergency Department on 8/23/24 and can return to school or work by the listed date with activity as tolerated.

## 2024-08-23 NOTE — ED PROVIDER NOTE - PHYSICAL EXAMINATION
As Follows:  CONST: Well appearing in NAD  EYES: PERRL, EOMI, Sclera and conjunctiva clear.   ENT: No nasal discharge.  Oropharynx normal appearing, no erythema or exudates. Uvula midline. Bilateral tonsillitis. Tender cervical lymphadenopathy.   CARD: No murmurs, rubs, or gallops; Normal rate and rhythm  RESP: BS Equal B/L, No wheezes, rhonchi or rales. No distress or accessory breathing  SKIN: Warm, dry, no acute rashes. MMM  NEURO: A&Ox3, No focal deficits. Strength and sensation intact. Steady gait

## 2024-08-23 NOTE — ED PROVIDER NOTE - OBJECTIVE STATEMENT
Patient is a 33 year old female presents for evaluation of headache over 3 days with bodyaches and sore throat over 1 week. She also admits to some mild chills today. She denies any documented fever, N/V, chest pain, shortness of breath, abdominal pain, or urinary complaints.

## 2024-08-23 NOTE — ED PROVIDER NOTE - NSFOLLOWUPINSTRUCTIONS_ED_ALL_ED_FT
TAKE YOUR ANTIBIOTICS AS PRESCRIBED    TAKE OVER THE COUNTER MEDS AS PRESCRIBED.     Strep Throat    Strep throat is an infection of the throat. It is caused by germs. Strep throat spreads from person to person because of coughing, sneezing, or close contact.    Follow these instructions at home:  Medicines     Take over-the-counter and prescription medicines only as told by your doctor.  Take your antibiotic medicine as told by your doctor. Do not stop taking the medicine even if you feel better.  Have family members who also have a sore throat or fever go to a doctor.  Eating and drinking     Do not share food, drinking cups, or personal items.  Try eating soft foods until your sore throat feels better.  Drink enough fluid to keep your pee (urine) clear or pale yellow.  General instructions     Rinse your mouth (gargle) with a salt-water mixture 3–4 times per day or as needed. To make a salt-water mixture, stir ½–1 tsp of salt into 1 cup of warm water.  Make sure that all people in your house wash their hands well.  Rest.  Stay home from school or work until you have been taking antibiotics for 24 hours.  Keep all follow-up visits as told by your doctor. This is important.    Contact a doctor if:  Your neck keeps getting bigger.  You get a rash, cough, or earache.  You cough up thick liquid that is green, yellow-brown, or bloody.  You have pain that does not get better with medicine.  Your problems get worse instead of getting better.  You have a fever.  Get help right away if:  You throw up (vomit).  You get a very bad headache.  You neck hurts or it feels stiff.  You have chest pain or you are short of breath.  You have drooling, very bad throat pain, or changes in your voice.  Your neck is swollen or the skin gets red and tender.  Your mouth is dry or you are peeing less than normal.  You keep feeling more tired or it is hard to wake up.  Your joints are red or they hurt.    This information is not intended to replace advice given to you by your health care provider. Make sure you discuss any questions you have with your health care provider.

## 2024-08-23 NOTE — ED PROVIDER NOTE - CLINICAL SUMMARY MEDICAL DECISION MAKING FREE TEXT BOX
Throughout ED observation period, pt remained clinically and hemodynamically stable.  Story and physical exam c/w viral pharyngitis vs strep, covid swab sent. will treat empirically w/ abx.   Will d/c with home care recommendations, pcp f/u and return precautions.

## 2024-08-23 NOTE — ED PROVIDER NOTE - PATIENT PORTAL LINK FT
You can access the FollowMyHealth Patient Portal offered by [facility] by registering at the following website: [email or web address]. By joining [employer]’s FollowMyHealth portal, you will also be able to view your health information using other applications (apps) compatible with our system.

## 2024-10-14 ENCOUNTER — APPOINTMENT (OUTPATIENT)
Dept: OTOLARYNGOLOGY | Facility: CLINIC | Age: 34
End: 2024-10-14
Payer: MEDICAID

## 2024-10-14 VITALS — HEIGHT: 61 IN | BODY MASS INDEX: 20.77 KG/M2 | WEIGHT: 110 LBS

## 2024-10-14 DIAGNOSIS — J34.89 OTHER SPECIFIED DISORDERS OF NOSE AND NASAL SINUSES: ICD-10-CM

## 2024-10-14 DIAGNOSIS — J32.4 CHRONIC PANSINUSITIS: ICD-10-CM

## 2024-10-14 PROCEDURE — 31231 NASAL ENDOSCOPY DX: CPT | Mod: 52

## 2024-10-14 PROCEDURE — 99204 OFFICE O/P NEW MOD 45 MIN: CPT | Mod: 25

## 2024-10-15 PROBLEM — J32.4 CHRONIC PANSINUSITIS: Status: ACTIVE | Noted: 2024-10-15

## 2024-10-15 PROBLEM — J34.89 NASAL OBSTRUCTION: Status: ACTIVE | Noted: 2024-10-15

## 2024-10-15 RX ORDER — ELASTIC BANDAGE 1"X2.2YD
2300-700 BANDAGE TOPICAL
Qty: 1 | Refills: 3 | Status: ACTIVE | COMMUNITY
Start: 2024-10-15 | End: 1900-01-01

## 2024-10-15 RX ORDER — LEVOCETIRIZINE DIHYDROCHLORIDE 5 MG/1
5 TABLET ORAL DAILY
Qty: 30 | Refills: 5 | Status: ACTIVE | COMMUNITY
Start: 2024-10-15 | End: 1900-01-01

## 2024-10-15 RX ORDER — FLUTICASONE PROPIONATE 50 UG/1
50 SPRAY, METERED NASAL TWICE DAILY
Qty: 1 | Refills: 3 | Status: ACTIVE | COMMUNITY
Start: 2024-10-15 | End: 1900-01-01

## 2024-11-26 NOTE — ED ADULT NURSE NOTE - CAS ELECT INFOMATION PROVIDED
Pt resting in bed . Aware of pending covid/flu/rsv result. Given warm blanket. No needs at this time. Call light within reach.    DC instructions

## 2025-04-10 ENCOUNTER — APPOINTMENT (OUTPATIENT)
Dept: OTOLARYNGOLOGY | Facility: CLINIC | Age: 35
End: 2025-04-10

## 2025-07-11 ENCOUNTER — EMERGENCY (EMERGENCY)
Facility: HOSPITAL | Age: 35
LOS: 0 days | Discharge: HOME | End: 2025-07-11
Attending: EMERGENCY MEDICINE
Payer: MEDICAID

## 2025-07-11 VITALS
HEART RATE: 64 BPM | TEMPERATURE: 98 F | OXYGEN SATURATION: 98 % | WEIGHT: 110.01 LBS | HEIGHT: 61 IN | SYSTOLIC BLOOD PRESSURE: 96 MMHG | RESPIRATION RATE: 16 BRPM | DIASTOLIC BLOOD PRESSURE: 68 MMHG

## 2025-07-11 VITALS
RESPIRATION RATE: 16 BRPM | DIASTOLIC BLOOD PRESSURE: 71 MMHG | TEMPERATURE: 98 F | OXYGEN SATURATION: 98 % | HEART RATE: 62 BPM | SYSTOLIC BLOOD PRESSURE: 104 MMHG

## 2025-07-11 DIAGNOSIS — M54.50 LOW BACK PAIN, UNSPECIFIED: ICD-10-CM

## 2025-07-11 DIAGNOSIS — Z90.79 ACQUIRED ABSENCE OF OTHER GENITAL ORGAN(S): Chronic | ICD-10-CM
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
## 2025-07-11 DIAGNOSIS — Z98.890 OTHER SPECIFIED POSTPROCEDURAL STATES: Chronic | ICD-10-CM

## 2025-07-11 PROCEDURE — 99283 EMERGENCY DEPT VISIT LOW MDM: CPT

## 2025-07-11 PROCEDURE — 99284 EMERGENCY DEPT VISIT MOD MDM: CPT

## 2025-07-11 RX ORDER — LIDOCAINE HYDROCHLORIDE 20 MG/ML
1 JELLY TOPICAL ONCE
Refills: 0 | Status: COMPLETED | OUTPATIENT
Start: 2025-07-11 | End: 2025-07-11

## 2025-07-11 RX ORDER — IBUPROFEN 200 MG
400 TABLET ORAL ONCE
Refills: 0 | Status: COMPLETED | OUTPATIENT
Start: 2025-07-11 | End: 2025-07-11

## 2025-07-11 RX ORDER — LIDOCAINE HYDROCHLORIDE 20 MG/ML
1 JELLY TOPICAL
Qty: 4 | Refills: 0
Start: 2025-07-11 | End: 2025-07-20

## 2025-07-11 RX ORDER — METHOCARBAMOL 500 MG/1
1000 TABLET, FILM COATED ORAL ONCE
Refills: 0 | Status: COMPLETED | OUTPATIENT
Start: 2025-07-11 | End: 2025-07-11

## 2025-07-11 RX ADMIN — LIDOCAINE HYDROCHLORIDE 1 PATCH: 20 JELLY TOPICAL at 02:08

## 2025-07-11 RX ADMIN — Medication 400 MILLIGRAM(S): at 02:08

## 2025-07-11 RX ADMIN — METHOCARBAMOL 1000 MILLIGRAM(S): 500 TABLET, FILM COATED ORAL at 02:09

## 2025-07-11 NOTE — ED PROVIDER NOTE - CLINICAL SUMMARY MEDICAL DECISION MAKING FREE TEXT BOX
Presenting for evaluation of lower back pain.  Requesting pain control with meds.  Symptoms improved.  Patient with some itching to her face after the meds with questionable allergy to Robaxin.  States that she is taking ibuprofen in the past.  Will discharge to continue with NSAIDs and lidocaine patch.

## 2025-07-11 NOTE — ED ADULT TRIAGE NOTE - IDEAL BODY WEIGHT(KG)
48 You can access the FollowMyHealth Patient Portal offered by [facility] by registering at the following website: [email or web address]. By joining [employer]’s FollowMyHealth portal, you will also be able to view your health information using other applications (apps) compatible with our system.
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
## 2025-07-11 NOTE — ED PROVIDER NOTE - PATIENT PORTAL LINK FT
You can access the FollowMyHealth Patient Portal offered by [facility] by registering at the following website: [email or web address]. By joining [employer]’s FollowMyHealth portal, you will also be able to view your health information using other applications (apps) compatible with our system.

## 2025-07-11 NOTE — ED ADULT NURSE NOTE - OBJECTIVE STATEMENT
Pt is a 34yr old female, alert & oriented x4. Pt is complaining of back pain. 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

## 2025-07-11 NOTE — ED PROVIDER NOTE - NSFOLLOWUPINSTRUCTIONS_ED_ALL_ED_FT
TAKE THE MEDICATIONS AS RECOMMENDED. FOLLOW UP WITH THE NEUROSURGERY TEAM    Our Emergency Department Referral Coordinators will be reaching out to you in the next 24-48 hours from 9:00am to 5:00pm to schedule a follow up appointment. Please expect a phone call from the hospital in that time frame. If you do not receive a call or if you have any questions or concerns, you can reach them at   [phone].    Back Pain    WHAT YOU NEED TO KNOW:    What do I need to know about back pain? Back pain is common. You may have back pain and muscle spasms. You may feel sore or stiff on one or both sides of your back. The pain may spread to your lower body.    What increases my risk for back pain?    A condition that affects your spine, joints, or muscles, such as muscle tension or disc problems    Repeated bending, lifting, or twisting, or lifting heavy items    Injury from a fall or accident    Lack of regular physical activity    Obesity or pregnancy    Smoking    Aging    Driving, sitting, or standing for long periods    Bad posture while sitting or standing  How is back pain diagnosed? Your healthcare provider will ask if you have any medical conditions. He or she may ask if you have a history of back pain and how it started. He or she may watch you stand and walk, and check your range of motion. Show him or her where you feel pain and what makes it better or worse. Describe the pain, how bad it is, and how long it lasts. Tell your provider if your pain worsens at night or when you lie on your back.    How is back pain treated?    Medicines:  NSAIDs help decrease swelling and pain or fever. This medicine is available with or without a doctor's order. NSAIDs can cause stomach bleeding or kidney problems in certain people. If you take blood thinner medicine, always ask your healthcare provider if NSAIDs are safe for you. Always read the medicine label and follow directions.    Acetaminophen decreases pain and fever. It is available without a doctor's order. Ask how much to take and how often to take it. Follow directions. Read the labels of all other medicines you are using to see if they also contain acetaminophen, or ask your doctor or pharmacist. Acetaminophen can cause liver damage if not taken correctly.    Muscle relaxers help decrease muscle spasms and back pain.    Acupressure may be recommended to decrease pain and improve movement. Acupressure is pressure or localized massage to the area of your back pain.    A transcutaneous electrical nerve stimulation (TENS) unit is a portable, pocket-sized, battery-powered device that attaches to your skin. It is usually placed over the area of pain. It uses mild, safe electrical signals to help control pain.  How do I manage back pain?    Apply ice on your back for 15 to 20 minutes every hour or as directed. Use an ice pack, or put crushed ice in a plastic bag. Cover it with a towel before you apply it to your skin. Ice helps prevent tissue damage and decreases pain.    Apply heat on your back for 20 to 30 minutes every 2 hours for as many days as directed. Heat helps decrease pain and muscle spasms.    Stay active as much as you can without causing more pain. Bed rest could make your back pain worse. Avoid heavy lifting until your pain is gone.   Family Walking for Exercise      Go to physical therapy as directed. A physical therapist can teach you exercises to help improve movement and strength, and to decrease pain.  Call your local emergency number (911 in the [place]) if:    You have severe back pain with chest pain.    You cannot control your urine or bowel movements.    Your pain becomes so severe that you cannot walk.  When should I seek immediate care?    You have pain, numbness, or weakness in one or both legs.    You have severe back pain, nausea, and vomiting.    You have severe back pain that spreads to your side or genital area.  When should I call my doctor?    You have back pain that does not get better with rest and pain medicine.    You have a fever.    You have pain that worsens when you are on your back or when you rest.    You have pain that worsens when you cough or sneeze.    You lose weight without trying.    You have questions or concerns about your condition or care.

## 2025-07-11 NOTE — ED PROVIDER NOTE - CARE PROVIDER_API CALL
Ebenezer Castro  Neurological Surgery  [address] [place] [address], [address]  [place] [address]  Phone: [phone]  Fax: [phone]  Follow Up Time: 7-10 Days

## 2025-07-11 NOTE — ED PROVIDER NOTE - PHYSICAL EXAMINATION
CONSTITUTIONAL: Well-developed; well-nourished; in no acute distress, nontoxic appearing  SKIN: skin exam is warm and dry,  HEAD: Normocephalic; atraumatic.  EYES: PERRL, 3 mm bilateral, no nystagmus, EOM intact; conjunctiva and sclera clear.  CARD: S1, S2 normal, no murmur  RESP: No wheezes, rales or rhonchi. Good air movement bilaterally  BACK: + bandlike lower back pain  ABD: soft; non-distended; non-tender. No Rebound, No guarding  EXT: Normal ROM. No cyanosis or edema. Dp Pulses intact.   NEURO: awake, alert, following commands, oriented, grossly unremarkable. No Focal deficits. GCS 15.   PSYCH: Cooperative, appropriate.